# Patient Record
Sex: FEMALE | Race: BLACK OR AFRICAN AMERICAN | NOT HISPANIC OR LATINO
[De-identification: names, ages, dates, MRNs, and addresses within clinical notes are randomized per-mention and may not be internally consistent; named-entity substitution may affect disease eponyms.]

---

## 2019-03-26 PROBLEM — Z00.00 ENCOUNTER FOR PREVENTIVE HEALTH EXAMINATION: Status: ACTIVE | Noted: 2019-03-26

## 2019-03-29 ENCOUNTER — APPOINTMENT (OUTPATIENT)
Dept: NEUROSURGERY | Facility: CLINIC | Age: 69
End: 2019-03-29
Payer: MEDICARE

## 2019-03-29 VITALS
HEIGHT: 63 IN | RESPIRATION RATE: 18 BRPM | WEIGHT: 133 LBS | DIASTOLIC BLOOD PRESSURE: 75 MMHG | OXYGEN SATURATION: 99 % | SYSTOLIC BLOOD PRESSURE: 149 MMHG | HEART RATE: 61 BPM | BODY MASS INDEX: 23.57 KG/M2

## 2019-03-29 DIAGNOSIS — Z87.39 PERSONAL HISTORY OF OTHER DISEASES OF THE MUSCULOSKELETAL SYSTEM AND CONNECTIVE TISSUE: ICD-10-CM

## 2019-03-29 DIAGNOSIS — Z86.59 PERSONAL HISTORY OF OTHER MENTAL AND BEHAVIORAL DISORDERS: ICD-10-CM

## 2019-03-29 DIAGNOSIS — Z87.898 PERSONAL HISTORY OF OTHER SPECIFIED CONDITIONS: ICD-10-CM

## 2019-03-29 PROCEDURE — 99205 OFFICE O/P NEW HI 60 MIN: CPT

## 2019-03-29 RX ORDER — PANTOPRAZOLE 40 MG/1
40 TABLET, DELAYED RELEASE ORAL
Refills: 0 | Status: ACTIVE | COMMUNITY

## 2019-03-29 NOTE — PLAN
[FreeTextEntry1] : The disease process of cavernous malformation and cerebral aneurysm were explained to the patient. \par Surgical treatment of cavernous malformation was offered to the patient by Dr. Bean but refuses. Will follow cav mal and aneurysm conservatively with MRI/A\par \par Plan:\par 1. Refer to epilepsy team to manage seizures and seizure medication\par 2. follow up MRA/I brain May 2019 to follow up cavernous malformation and aneurysm

## 2019-03-29 NOTE — REASON FOR VISIT
[New Patient Visit] : a new patient visit [Referred By: _________] : Patient was referred by MIN [FreeTextEntry1] : to evaluate cerebral aneurysm

## 2019-03-29 NOTE — PHYSICAL EXAM
[General Appearance - Alert] : alert [General Appearance - In No Acute Distress] : in no acute distress [Oriented To Time, Place, And Person] : oriented to person, place, and time [Impaired Insight] : insight and judgment were intact [Person] : oriented to person [Place] : oriented to place [Time] : oriented to time [Cranial Nerves Optic (II)] : visual acuity intact bilaterally,  pupils equal round and reactive to light [Cranial Nerves Oculomotor (III)] : extraocular motion intact [Cranial Nerves Trigeminal (V)] : facial sensation intact symmetrically [Cranial Nerves Facial (VII)] : face symmetrical [Cranial Nerves Vestibulocochlear (VIII)] : hearing was intact bilaterally [Cranial Nerves Glossopharyngeal (IX)] : tongue and palate midline [Cranial Nerves Accessory (XI - Cranial And Spinal)] : head turning and shoulder shrug symmetric [Cranial Nerves Hypoglossal (XII)] : there was no tongue deviation with protrusion [Motor Tone] : muscle tone was normal in all four extremities [Motor Strength] : muscle strength was normal in all four extremities [PERRL With Normal Accommodation] : pupils were equal in size, round, reactive to light, with normal accommodation [Full Visual Field] : full visual field [Neck Appearance] : the appearance of the neck was normal [] : no respiratory distress [Respiration, Rhythm And Depth] : normal respiratory rhythm and effort [Exaggerated Use Of Accessory Muscles For Inspiration] : no accessory muscle use [Auscultation Breath Sounds / Voice Sounds] : lungs were clear to auscultation bilaterally [Apical Impulse] : the apical impulse was normal [Heart Rate And Rhythm] : heart rate was normal and rhythm regular [Heart Sounds] : normal S1 and S2 [Abnormal Walk] : normal gait

## 2019-03-29 NOTE — REVIEW OF SYSTEMS
[Feeling Poorly] : feeling poorly [Feeling Tired] : feeling tired [As Noted in HPI] : as noted in HPI [Arm Weakness] : arm weakness [Hand Weakness] :  hand weakness [Numbness] : numbness [Tingling] : tingling [Abnormal Sensation] : an abnormal sensation [Hypersensitivity] : hypersensitivity [Seizures] : convulsions [Vertigo] : vertigo [Anxiety] : anxiety [Palpitations] : palpitations [Heartburn] : heartburn [Joint Stiffness] : joint stiffness [Negative] : Respiratory

## 2019-03-29 NOTE — HISTORY OF PRESENT ILLNESS
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 \par \par The cavernous malformations were being followed by Dr. Jones in [place] and did not recommend treatment, per patient. She denies headaches but c/o weakness and seizures which were controlled by Lamictal 100mg daily until the change in medication to Lamotrigine. Seizure activities have increased to once daily since starting on Lamotrigine 2 weeks ago.\par \par She has no hx of HTN and denies family hx of cerebral aneurysm.\par \par Handedness: RIGHT\par \par Patient Address:\par [address]\[place] [address]\par \par Referring MD\par Dr. Luis Shoemaker\par [address]\[place] [address]\par \par \par PCP:

## 2019-03-29 NOTE — DATA REVIEWED
[de-identified] : MRI brain from 5/2019: no significant change in the right cerebellar cavernoma with assoc. venous angioma/deveopmental venous anomaly. No significant change in the posterior right temporal/inferior right parietal cavernoma. No evidence of recent infarction. No evidence of recent hemorrhage [de-identified] : MRA:  2.8 mm supraclinoid L ICA aneurysm

## 2019-06-24 NOTE — HISTORY OF PRESENT ILLNESS
[FreeTextEntry1] : RIGHT-HANDED Non-smoker with history of right posterior temporal/parietal cavernoma and right cerebellar cavernoma/venous anomaly (stable exam from 2016, 2017, and 2018 per radiology reports), seizures (since 2005) who initially presented to the office to evaluate a 2.8 mm supraclinoid L ICA aneurysm found on a 2016 MRA for headache workup. \par \par The cavernous malformations were being followed by Dr. Jones in [place] and did not recommend treatment, per patient. She denied headaches but c/o weakness and seizures which were controlled by Lamictal 100mg daily until the change in medication to Lamotrigine. Seizure activities have increased to once daily since starting on Lamotrigine 2 weeks ago. She was referred to epilepsy team to manage seizures and AEDs.\par \par She has no hx of HTN and denies family hx of cerebral aneurysm.\par \par MRI brain from 5/2019: no significant change in the right cerebellar cavernoma with assoc. venous angioma/developmental venous anomaly. No significant change in the posterior right temporal/inferior right parietal cavernoma. No evidence of recent infarction. No evidence of recent hemorrhage \par \par MRA: MRA: 2.8 mm supraclinoid L ICA aneurysm \par \par Handedness: RIGHT\par \par Patient Address:\par [address]\[place] [address]\par \par Referring MD and PCP\par Dr. Luis Shoemaker\par [address]\[place] [address]\par \par \par \par  \par

## 2019-06-24 NOTE — REASON FOR VISIT
[Follow-Up: _____] : a [unfilled] follow-up visit [FreeTextEntry1] : to review MRI/A for cavernous malformation and aneurysm

## 2019-06-25 ENCOUNTER — APPOINTMENT (OUTPATIENT)
Dept: NEUROSURGERY | Facility: CLINIC | Age: 69
End: 2019-06-25

## 2019-10-04 ENCOUNTER — NON-APPOINTMENT (OUTPATIENT)
Age: 69
End: 2019-10-04

## 2019-10-04 ENCOUNTER — APPOINTMENT (OUTPATIENT)
Dept: HEART AND VASCULAR | Facility: CLINIC | Age: 69
End: 2019-10-04
Payer: MEDICARE

## 2019-10-04 VITALS
DIASTOLIC BLOOD PRESSURE: 84 MMHG | RESPIRATION RATE: 20 BRPM | SYSTOLIC BLOOD PRESSURE: 120 MMHG | HEIGHT: 63 IN | BODY MASS INDEX: 23.21 KG/M2 | WEIGHT: 131 LBS | HEART RATE: 72 BPM

## 2019-10-04 DIAGNOSIS — M47.899 OTHER SPONDYLOSIS, SITE UNSPECIFIED: ICD-10-CM

## 2019-10-04 DIAGNOSIS — M16.11 UNILATERAL PRIMARY OSTEOARTHRITIS, RIGHT HIP: ICD-10-CM

## 2019-10-04 PROCEDURE — 99204 OFFICE O/P NEW MOD 45 MIN: CPT

## 2019-10-04 PROCEDURE — 93000 ELECTROCARDIOGRAM COMPLETE: CPT

## 2019-10-04 RX ORDER — ASCORBIC ACID 500 MG
TABLET ORAL
Refills: 0 | Status: ACTIVE | COMMUNITY

## 2019-10-04 NOTE — PHYSICAL EXAM
[General Appearance - Well Developed] : well developed [Normal Appearance] : normal appearance [General Appearance - Well Nourished] : well nourished [No Deformities] : no deformities [Normal Conjunctiva] : the conjunctiva exhibited no abnormalities [Normal Oral Mucosa] : normal oral mucosa [Normal Oropharynx] : normal oropharynx [Heart Rate And Rhythm] : heart rate and rhythm were normal [Heart Sounds] : normal S1 and S2 [Murmurs] : no murmurs present [Arterial Pulses Normal] : the arterial pulses were normal [Exaggerated Use Of Accessory Muscles For Inspiration] : no accessory muscle use [Respiration, Rhythm And Depth] : normal respiratory rhythm and effort [Auscultation Breath Sounds / Voice Sounds] : lungs were clear to auscultation bilaterally [Abdomen Soft] : soft [Bowel Sounds] : normal bowel sounds [Abdomen Tenderness] : non-tender [Abnormal Walk] : normal gait [Nail Clubbing] : no clubbing of the fingernails [Cyanosis, Localized] : no localized cyanosis [Petechial Hemorrhages (___cm)] : no petechial hemorrhages [Nail Splinter Hemorrhages] : no splinter hemorrhages of the nails [Skin Color & Pigmentation] : normal skin color and pigmentation [Skin Turgor] : normal skin turgor [] : no rash [No Venous Stasis] : no venous stasis [Oriented To Time, Place, And Person] : oriented to person, place, and time [Impaired Insight] : insight and judgment were intact [Affect] : the affect was normal [Mood] : the mood was normal [No Anxiety] : not feeling anxious [FreeTextEntry1] : No JVD

## 2019-10-04 NOTE — HISTORY OF PRESENT ILLNESS
[FreeTextEntry1] : 67 y/o female with history of seizure d/o, OA, cavernous malformations, and PACs noted on EKG present for evaluation of PACs. She states that she was noted to have PACs, but was unaware of them. She was referred because they noted on the EKG. She states she has had episodes in the past were shes felt some palpitations, short lived. They do not bother her. She had a Holter monitor in the last two years per her, and states it was normal. She occasionally feels her heart beating when shes exercising. She denies CP, SOB, ATKINSON, pre-syncope, or syncope. She has had an echo and stress test per her done in the past, which were also normal.

## 2019-10-09 NOTE — REASON FOR VISIT
[Follow-Up: _____] : a [unfilled] follow-up visit [FreeTextEntry1] : LAST VISIT 3/29/19:\par \par She denied headaches but complained of increasing seizure activities (once daily) since she was started on Lamotrigine.\par \par MRI brain from 5/2018 showed no significant change in the right cerebellar cavernoma with assoc. venous angioma/developmental venous anomaly. No significant change in the posterior right temporal/inferior right parietal cavernoma. No evidence of recent infarction. No evidence of recent hemorrhage \par \par  MRA brain: 2.8 mm unchanged dural ring L ICA aneurysm\par .\par The surgical treatment of cavernous malformation was offered to the patient but she wished to follow it conservatively with an MRI/A. She was advised to obtain an MRI/A brain in May 2019. She was referred to epilepsy team in managing her seizures.\par  \par TODAY'S VISIT:

## 2019-10-09 NOTE — END OF VISIT
[FreeTextEntry3] : Written by Rosie Coronel NP acting as a scribe for Dr. Js Bean MD.  The documentation recorded by the scribe accurately reflects the service I personally performed and the decisions made by me, Dr. Js Bean \par \par

## 2019-10-09 NOTE — HISTORY OF PRESENT ILLNESS
[FreeTextEntry1] : RIGHT-HANDED Non-smoker with history of right posterior temporal/parietal cavernoma and right cerebellar cavernoma/venous anomaly (stable exam from 2016, 2017, and 2018 per radiology reports), seizures (since 2005) who presented to the office to evaluate a 2.8 mm supraclinoid L ICA aneurysm found on a 2016 MRA for headache workup. \par \par The cavernous malformations were being followed by Dr. Jones in [place] and did not recommend treatment, per patient. She had no headaches but c/o weakness and seizures which were controlled by Lamictal 100mg daily until the change in medication to Lamotrigine. Seizure activities increased to once daily since starting on Lamotrigine.\par \par She has no hx of HTN and denies family hx of cerebral aneurysm.\par \par Handedness: RIGHT\par \par Patient Address:\par [address]\[place] [address]\par \par Referring MD/ PCP:\par Dr. Luis Shoemaker\par [address]\[place] [address]\par \par \par

## 2019-10-10 ENCOUNTER — APPOINTMENT (OUTPATIENT)
Dept: NEUROSURGERY | Facility: CLINIC | Age: 69
End: 2019-10-10

## 2020-07-21 ENCOUNTER — APPOINTMENT (OUTPATIENT)
Dept: CARDIOLOGY | Facility: CLINIC | Age: 70
End: 2020-07-21
Payer: MEDICARE

## 2020-07-21 VITALS
OXYGEN SATURATION: 98 % | HEART RATE: 64 BPM | RESPIRATION RATE: 20 BRPM | DIASTOLIC BLOOD PRESSURE: 60 MMHG | HEIGHT: 63 IN | SYSTOLIC BLOOD PRESSURE: 100 MMHG

## 2020-07-21 DIAGNOSIS — R42 DIZZINESS AND GIDDINESS: ICD-10-CM

## 2020-07-21 PROCEDURE — 0296T: CPT

## 2020-07-21 PROCEDURE — 99204 OFFICE O/P NEW MOD 45 MIN: CPT

## 2020-07-21 NOTE — ASSESSMENT
[FreeTextEntry1] : EKG: July 2020 - NSR, LAE, NSTT\par Apr 2019 - sr with pvs and pacs in bigeminal pattern. septal infarct. \par \par zio patch - placed\par \par carotid ultrasound\par 2d echo\par Nuclear stress test. pt would like to start with exercise test first. \par

## 2020-07-21 NOTE — HISTORY OF PRESENT ILLNESS
[FreeTextEntry1] : 68 y/o F with petit mal seizure and has some previous cardiac arrhythmias who presents for general checkup. \par She is concerned if the pauses are decreasing oxygen to her brain. \par July 16 2020 - NSR, LAE, NSTT - \par \par She has had PVCs in the past.\par \par Last seizure activity was last week. \par she has cavernous  angiomas and needs follow ups with neurosurgery. \par She complains of dizziness and palpitations. \par \par Pt has intermittent sob on exertion. she denies chest pain , orthopnea, pnd, edema. \par \par

## 2020-07-21 NOTE — PHYSICAL EXAM
[General Appearance - Well Developed] : well developed [Normal Appearance] : normal appearance [Well Groomed] : well groomed [No Deformities] : no deformities [General Appearance - Well Nourished] : well nourished [General Appearance - In No Acute Distress] : no acute distress [Normal Conjunctiva] : the conjunctiva exhibited no abnormalities [Eyelids - No Xanthelasma] : the eyelids demonstrated no xanthelasmas [Normal Oral Mucosa] : normal oral mucosa [No Oral Pallor] : no oral pallor [No Oral Cyanosis] : no oral cyanosis [Normal Jugular Venous A Waves Present] : normal jugular venous A waves present [Normal Jugular Venous V Waves Present] : normal jugular venous V waves present [Heart Rate And Rhythm] : heart rate and rhythm were normal [No Jugular Venous Alexis A Waves] : no jugular venous alexis A waves [Heart Sounds] : normal S1 and S2 [Murmurs] : no murmurs present [Exaggerated Use Of Accessory Muscles For Inspiration] : no accessory muscle use [Respiration, Rhythm And Depth] : normal respiratory rhythm and effort [Auscultation Breath Sounds / Voice Sounds] : lungs were clear to auscultation bilaterally [Abdomen Soft] : soft [Abdomen Tenderness] : non-tender [Abdomen Mass (___ Cm)] : no abdominal mass palpated [Abnormal Walk] : normal gait [Gait - Sufficient For Exercise Testing] : the gait was sufficient for exercise testing [Nail Clubbing] : no clubbing of the fingernails [Cyanosis, Localized] : no localized cyanosis [Petechial Hemorrhages (___cm)] : no petechial hemorrhages [Skin Color & Pigmentation] : normal skin color and pigmentation [] : no rash [Skin Lesions] : no skin lesions [No Venous Stasis] : no venous stasis [No Skin Ulcers] : no skin ulcer [No Xanthoma] : no  xanthoma was observed [Oriented To Time, Place, And Person] : oriented to person, place, and time [Affect] : the affect was normal [Mood] : the mood was normal [No Anxiety] : not feeling anxious

## 2020-08-19 ENCOUNTER — RESULT REVIEW (OUTPATIENT)
Age: 70
End: 2020-08-19

## 2020-08-26 ENCOUNTER — APPOINTMENT (OUTPATIENT)
Dept: NEUROLOGY | Facility: CLINIC | Age: 70
End: 2020-08-26
Payer: MEDICARE

## 2020-08-26 VITALS
SYSTOLIC BLOOD PRESSURE: 123 MMHG | HEART RATE: 66 BPM | DIASTOLIC BLOOD PRESSURE: 81 MMHG | WEIGHT: 125 LBS | BODY MASS INDEX: 22.15 KG/M2 | HEIGHT: 63 IN

## 2020-08-26 DIAGNOSIS — H93.8X3 OTHER SPECIFIED DISORDERS OF EAR, BILATERAL: ICD-10-CM

## 2020-08-26 DIAGNOSIS — G40.109 LOCALIZATION-RELATED (FOCAL) (PARTIAL) SYMPTOMATIC EPILEPSY AND EPILEPTIC SYNDROMES WITH SIMPLE PARTIAL SEIZURES, NOT INTRACTABLE, W/OUT STATUS EPILEPTICUS: ICD-10-CM

## 2020-08-26 PROCEDURE — 99205 OFFICE O/P NEW HI 60 MIN: CPT

## 2020-08-26 NOTE — ASSESSMENT
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 The seizures are not well controlled on lamictal  mg daily.  She has them every few weeks to few months. She says that she is compliant. She had lethargy on lamictal  mg daily.  Increase lamictal ER to 125 mg daily. \par Routine and ambulatory EEG\par \par Referral to neuroendovascular specialist for regular follow up for the cavernous malformation and cerebral aneurysm.  \par \par Feeling of fullness in her ears - ENT consult.\par \par F/U three months or a few weeks after amb EEG.

## 2020-08-26 NOTE — CONSULT LETTER
[Dear  ___] : Dear  [unfilled], [FreeTextEntry1] : I had the pleasure of evaluating your patient, LAURA HSU, Please see the assessment section below for a summary of my diagnostic impression and plan.\par \par Thank you very much for allowing me to participate in the care of this patient. If you have any questions, please do not hesitate to contact me.\par \par Sincerely,\par \par Josefina Silva MD\par \par \par

## 2020-08-26 NOTE — HISTORY OF PRESENT ILLNESS
[FreeTextEntry1] : Soledad Springer is a 69 year old woman with a history of right posterior temporal cavernoma and right cerebellar cavernous/venous anomaly and supraclinoid ICA aneurysm and has yearly MRI/MRA with neurosurgery. She presents today for a consultation for seizures. \par \par She was diagnosed with seizures in 2006. She describes a transient numbness to the left cheek and tingling to the face, arm, and leg on the left side.  These episodes have not changed for the past 10 years.  She previously had an aura of "sour stomach" feeling but no longer has this aura with her seizures.  Ms. Springer was previously on Gabepentin.  In 2010, she had an admission to [facility] with no seizure episodes and reported normal EEG. She was started on Lamictal 100mg ER at that time. She was unable to tolerate Lamictal 200mg due to side effects of fatigue.  Ms. Springer reports a seizure episode this morning. \par \par She has rare episodes of lightheadedness likes she is going to lose consciousness. \par \par She also reports a fullness feeling in her ears. Ms. Springer has not seen ENT. \par \par The remaining neurological review of systems is negative. \par

## 2020-08-26 NOTE — PHYSICAL EXAM
[FreeTextEntry1] : Physical examination \par General: No acute distress, Awake, Alert.   \par Slight postsurgical pupillary chages from cataract surgery. \par \par Mental status \par Awake, alert, and oriented to person, time and place, Normal attention span and concentration, Recent and remote memory intact, Language intact, Fund of knowledge intact.   \par Delayed recall 2/3.\par \par Cranial Nerves \par II: VFF  \par III, IV, VI: Slightly irregular,  EOMI.    \par V: Facial sensation is normal B/L.   \par VII: Facial strength mild left facial weakness - UMN type. \par \par \par VIII: Gross hearing is intact.   \par \par \par IX, X: Palate is midline and elevates symmetrically.   \par XI: Trapezius normal strength.   \par XII: Tongue midline without atrophy or fasciculations. \par \par Motor exam  \par Muscle tone - no evidence of rigidity or resistance in all 4 extremities.  \par No atrophy or fasciculations \par Muscle Strength: arms and legs, proximal and distal flexors and extensors are normal \par \par No UE drift.\par \par Reflexes \par Diffuse hyperreflexia.  \par \par Plantars right: mute.   \par Plantars left: mute.   \par \par \par Coordination \par Finger to nose: Normal.  \par Heel to shin: Normal.   \par \par \par Sensory \par Intact sensation to vibration and cold.\par \par Gait \par Normal including heels, toes, and tandem gait.  \par

## 2020-09-08 ENCOUNTER — APPOINTMENT (OUTPATIENT)
Dept: CARDIOLOGY | Facility: CLINIC | Age: 70
End: 2020-09-08
Payer: MEDICARE

## 2020-09-08 VITALS
BODY MASS INDEX: 22.15 KG/M2 | SYSTOLIC BLOOD PRESSURE: 122 MMHG | DIASTOLIC BLOOD PRESSURE: 70 MMHG | WEIGHT: 125 LBS | HEART RATE: 64 BPM | HEIGHT: 63 IN

## 2020-09-08 PROCEDURE — 99214 OFFICE O/P EST MOD 30 MIN: CPT

## 2020-09-08 RX ORDER — CHROMIUM 200 MCG
TABLET ORAL
Refills: 0 | Status: DISCONTINUED | COMMUNITY
End: 2020-09-08

## 2020-09-08 NOTE — ASSESSMENT
[FreeTextEntry1] : EKG: July 2020 - NSR, LAE, NSTT\par Apr 2019 - sr with pvs and pacs in bigeminal pattern. septal infarct. \par \par zio patch - pt returned today and will call in 1-2 weeks for results. \par \par carotid ultrasound - normal. \par 2d echo - normal ef - left atrial size moderately enlarged\par Nuclear stress test. change to pharmacologic as pt is unable to tolerate treadmill. \par

## 2020-09-08 NOTE — HISTORY OF PRESENT ILLNESS
[FreeTextEntry1] : 70 y/o F with petit mal seizure and has some previous cardiac arrhythmias who presents for general checkup. \par She is concerned if the pauses are decreasing oxygen to her brain. \par July 16 2020 - NSR, LAE, NSTT - \par \par She has had PVCs in the past.\par \par Last seizure activity was last week. \par she has cavernous  angiomas and needs follow ups with neurosurgery. \par She complains of dizziness and palpitations. \par \par since last visit\par Pt has intermittent sob on exertion. she denies chest pain , orthopnea, pnd, edema. \par carotid normal\par did not have stress test as pt injured her right hip. \par

## 2020-09-08 NOTE — PHYSICAL EXAM
[General Appearance - Well Developed] : well developed [Normal Appearance] : normal appearance [Well Groomed] : well groomed [General Appearance - Well Nourished] : well nourished [No Deformities] : no deformities [General Appearance - In No Acute Distress] : no acute distress [Normal Conjunctiva] : the conjunctiva exhibited no abnormalities [Eyelids - No Xanthelasma] : the eyelids demonstrated no xanthelasmas [Normal Oral Mucosa] : normal oral mucosa [No Oral Pallor] : no oral pallor [No Oral Cyanosis] : no oral cyanosis [Normal Jugular Venous A Waves Present] : normal jugular venous A waves present [Normal Jugular Venous V Waves Present] : normal jugular venous V waves present [No Jugular Venous Alexis A Waves] : no jugular venous alexis A waves [Respiration, Rhythm And Depth] : normal respiratory rhythm and effort [Exaggerated Use Of Accessory Muscles For Inspiration] : no accessory muscle use [Auscultation Breath Sounds / Voice Sounds] : lungs were clear to auscultation bilaterally [Heart Rate And Rhythm] : heart rate and rhythm were normal [Heart Sounds] : normal S1 and S2 [Murmurs] : no murmurs present [Abdomen Soft] : soft [Abdomen Tenderness] : non-tender [Abdomen Mass (___ Cm)] : no abdominal mass palpated [Abnormal Walk] : normal gait [Gait - Sufficient For Exercise Testing] : the gait was sufficient for exercise testing [Nail Clubbing] : no clubbing of the fingernails [Cyanosis, Localized] : no localized cyanosis [Petechial Hemorrhages (___cm)] : no petechial hemorrhages [Skin Color & Pigmentation] : normal skin color and pigmentation [] : no rash [No Venous Stasis] : no venous stasis [Skin Lesions] : no skin lesions [No Skin Ulcers] : no skin ulcer [No Xanthoma] : no  xanthoma was observed [Oriented To Time, Place, And Person] : oriented to person, place, and time [Affect] : the affect was normal [Mood] : the mood was normal [No Anxiety] : not feeling anxious

## 2020-09-24 ENCOUNTER — APPOINTMENT (OUTPATIENT)
Dept: NEUROLOGY | Facility: CLINIC | Age: 70
End: 2020-09-24

## 2021-03-09 ENCOUNTER — APPOINTMENT (OUTPATIENT)
Dept: CARDIOLOGY | Facility: CLINIC | Age: 71
End: 2021-03-09

## 2022-01-14 ENCOUNTER — APPOINTMENT (OUTPATIENT)
Dept: CARDIOLOGY | Facility: CLINIC | Age: 72
End: 2022-01-14

## 2022-01-27 ENCOUNTER — NON-APPOINTMENT (OUTPATIENT)
Age: 72
End: 2022-01-27

## 2022-01-27 ENCOUNTER — APPOINTMENT (OUTPATIENT)
Dept: CARDIOLOGY | Facility: CLINIC | Age: 72
End: 2022-01-27
Payer: MEDICARE

## 2022-01-27 VITALS
WEIGHT: 125 LBS | OXYGEN SATURATION: 98 % | DIASTOLIC BLOOD PRESSURE: 84 MMHG | HEIGHT: 63 IN | HEART RATE: 60 BPM | BODY MASS INDEX: 22.15 KG/M2 | SYSTOLIC BLOOD PRESSURE: 126 MMHG

## 2022-01-27 DIAGNOSIS — R06.02 SHORTNESS OF BREATH: ICD-10-CM

## 2022-01-27 PROCEDURE — 93000 ELECTROCARDIOGRAM COMPLETE: CPT

## 2022-01-27 PROCEDURE — 99214 OFFICE O/P EST MOD 30 MIN: CPT

## 2022-01-27 RX ORDER — LAMOTRIGINE 25 MG/1
25 TABLET ORAL
Qty: 150 | Refills: 5 | Status: DISCONTINUED | COMMUNITY
Start: 2020-09-02 | End: 2022-01-27

## 2022-01-27 NOTE — REASON FOR VISIT
[Arrhythmia/ECG Abnorrmalities] : arrhythmia/ECG abnormalities [Follow-Up - Clinic] : a clinic follow-up of

## 2022-01-27 NOTE — ASSESSMENT
[FreeTextEntry1] : EKG: July 2020 - NSR, LAE, NSTT\par Apr 2019 - sr with pvs and pacs in bigeminal pattern. septal infarct. \par \par zio patch - pt returned today and will call in 1-2 weeks for results. \par \par carotid ultrasound - normal. \par 2d echo - normal ef - left atrial size moderately enlarged\par Nuclear stress test. change to pharmacologic as pt is unable to tolerate treadmill. \par \par \par Jan 2022\par ekg: \par junctional rhythm with PVC - pt denies palpitations , or syncope. \par pt with fatty liver and uncontrolled lipid panel\par has dizziness - seeing ENT. \par has mild sob on exertion previous pharm stress never done\par order 2d echo\par pharm stress\par carotid u/s

## 2022-02-01 PROBLEM — D72.819 LEUKOPENIA: Status: ACTIVE | Noted: 2022-02-01

## 2022-02-01 PROBLEM — D64.9 ANEMIA: Status: ACTIVE | Noted: 2022-02-01

## 2022-02-01 PROBLEM — E67.3 HIGH VITAMIN D LEVEL: Status: ACTIVE | Noted: 2022-02-01

## 2022-02-01 PROBLEM — R74.8 ELEVATED VITAMIN B12 LEVEL: Status: ACTIVE | Noted: 2022-02-01

## 2022-02-01 PROBLEM — D72.810 LYMPHOPENIA: Status: ACTIVE | Noted: 2022-02-01

## 2022-02-04 ENCOUNTER — RESULT REVIEW (OUTPATIENT)
Age: 72
End: 2022-02-04

## 2022-02-04 ENCOUNTER — APPOINTMENT (OUTPATIENT)
Dept: HEMATOLOGY ONCOLOGY | Facility: CLINIC | Age: 72
End: 2022-02-04
Payer: MEDICARE

## 2022-02-04 VITALS
HEART RATE: 59 BPM | HEIGHT: 63 IN | TEMPERATURE: 96.4 F | WEIGHT: 125.06 LBS | OXYGEN SATURATION: 100 % | DIASTOLIC BLOOD PRESSURE: 73 MMHG | RESPIRATION RATE: 18 BRPM | BODY MASS INDEX: 22.16 KG/M2 | SYSTOLIC BLOOD PRESSURE: 156 MMHG

## 2022-02-04 DIAGNOSIS — D64.9 ANEMIA, UNSPECIFIED: ICD-10-CM

## 2022-02-04 DIAGNOSIS — R77.2 ABNORMALITY OF ALPHAFETOPROTEIN: ICD-10-CM

## 2022-02-04 DIAGNOSIS — D72.819 DECREASED WHITE BLOOD CELL COUNT, UNSPECIFIED: ICD-10-CM

## 2022-02-04 DIAGNOSIS — D72.810 LYMPHOCYTOPENIA: ICD-10-CM

## 2022-02-04 DIAGNOSIS — E67.3 HYPERVITAMINOSIS D: ICD-10-CM

## 2022-02-04 DIAGNOSIS — R74.8 ABNORMAL LEVELS OF OTHER SERUM ENZYMES: ICD-10-CM

## 2022-02-04 PROCEDURE — 99205 OFFICE O/P NEW HI 60 MIN: CPT

## 2022-02-04 RX ORDER — NICOTINE POLACRILEX 2 MG
67 LOZENGE BUCCAL
Refills: 0 | Status: ACTIVE | COMMUNITY
Start: 2022-02-04

## 2022-02-04 RX ORDER — VIT C/ZN GLUC/HERBAL NO.325 90 MG-15MG
LOZENGE MUCOUS MEMBRANE
Refills: 0 | Status: ACTIVE | COMMUNITY
Start: 2022-02-04

## 2022-02-04 NOTE — ASSESSMENT
[FreeTextEntry1] : #anemia\par We have discussed the differential diagnosis of anemia.\par Will also rule out causes of anemia including nutritional deficiencies, thyroid dysfunction, hemolysis and hematologic disorders. Will check CBC with diff, CMP, LDH, Haptoglobin, Jaquan, B12, Folate, TSH, retic ct, Epo, PS, ESR/CRP, immunoglobulins, KWAN, iron and ferritin\par \par #leukopenia\par We have reviewed the differential diagnosis of pancytopenia including nutritional deficiencies, autoimmune dz, viruses, medications.\par We have reviewed the work up thus far. Will do complete work up including CBC with diff, CMP, retic, LDH, Haptoglobin, Jaquan, ESR/CRP, B12/FOLATE, iron panel and ferritin, KWAN, immunoglobulins,FERNANDO. \par \par #elevated AFP and liver cysts\par recheck AFP\par MRI liver\par reviewed US reports - fatty liver and liver cysts\par \par #elevated vit D. - told to stop taking vit D\par \par #elevated b12 - told to stop taking b12\par \par RTC in 10 days to discuss bloodwork

## 2022-02-04 NOTE — HISTORY OF PRESENT ILLNESS
[de-identified] : Ms. Springer is a 71 year old woman who presents for initial consultation of abnormal blood work.\par Anemia, leukopenia, and lymphopenia date back to \par Blood work from 2022 revealed elevated B12 >2000, elevated vitamin D @ 82, and elevated vitamin D1, 25 @ 99\par She reports previously taking PO iron intermittently since 2019; never received IV iron or blood transfusion\par \par Of note, she has significant medical history of cerebral aneurysms, seizure disorder, cavernous malformation-followed closely by neurosurgery\par \par She denies frequent illness and fevers, reports hot flashes "come and go" with associated palpitations\par She reports stable dizziness and lightheadedness since seizure disorder diagnosis\par \par Age of Menarche: 11\par Age of Menopause: hysterectomy  for fibroids\par OCP/HRT: OCP <1 year briefly due to cystic breasts\par ,   x1

## 2022-02-04 NOTE — PHYSICAL EXAM
home when stable ambulating [Fully active, able to carry on all pre-disease performance without restriction] : Status 0 - Fully active, able to carry on all pre-disease performance without restriction [Normal] : affect appropriate

## 2022-02-09 ENCOUNTER — RESULT REVIEW (OUTPATIENT)
Age: 72
End: 2022-02-09

## 2022-02-11 NOTE — HISTORY OF PRESENT ILLNESS
[de-identified] : Ms. Springer is a 71 year old woman who presents for initial consultation of abnormal blood work.\par Anemia, leukopenia, and lymphopenia date back to \par Blood work from 2022 revealed elevated B12 >2000, elevated vitamin D @ 82, and elevated vitamin D1, 25 @ 99\par She reports previously taking PO iron intermittently since 2019; never received IV iron or blood transfusion\par \par Of note, she has significant medical history of cerebral aneurysms, seizure disorder, cavernous malformation-followed closely by neurosurgery\par \par She denies frequent illness and fevers, reports hot flashes "come and go" with associated palpitations\par She reports stable dizziness and lightheadedness since seizure disorder diagnosis\par \par Age of Menarche: 11\par Age of Menopause: hysterectomy  for fibroids\par OCP/HRT: OCP <1 year briefly due to cystic breasts\par ,   x1

## 2022-02-17 ENCOUNTER — RESULT REVIEW (OUTPATIENT)
Age: 72
End: 2022-02-17

## 2022-02-18 ENCOUNTER — APPOINTMENT (OUTPATIENT)
Dept: HEMATOLOGY ONCOLOGY | Facility: CLINIC | Age: 72
End: 2022-02-18

## 2022-03-09 ENCOUNTER — NON-APPOINTMENT (OUTPATIENT)
Age: 72
End: 2022-03-09

## 2022-03-09 ENCOUNTER — APPOINTMENT (OUTPATIENT)
Dept: NEUROLOGY | Facility: CLINIC | Age: 72
End: 2022-03-09
Payer: MEDICARE

## 2022-03-09 VITALS
HEART RATE: 68 BPM | HEIGHT: 63 IN | BODY MASS INDEX: 21.79 KG/M2 | SYSTOLIC BLOOD PRESSURE: 105 MMHG | TEMPERATURE: 98.2 F | OXYGEN SATURATION: 99 % | DIASTOLIC BLOOD PRESSURE: 68 MMHG | WEIGHT: 123 LBS

## 2022-03-09 DIAGNOSIS — Z78.9 OTHER SPECIFIED HEALTH STATUS: ICD-10-CM

## 2022-03-09 DIAGNOSIS — Z82.49 FAMILY HISTORY OF ISCHEMIC HEART DISEASE AND OTHER DISEASES OF THE CIRCULATORY SYSTEM: ICD-10-CM

## 2022-03-09 DIAGNOSIS — Z82.61 FAMILY HISTORY OF ARTHRITIS: ICD-10-CM

## 2022-03-09 PROCEDURE — 99215 OFFICE O/P EST HI 40 MIN: CPT

## 2022-03-09 NOTE — PHYSICAL EXAM
[FreeTextEntry1] : Physical examination \par General: No acute distress, Awake, Alert.   \par Slight postsurgical pupillary changes from cataract surgery. \par \par Mental status \par Awake, alert, and oriented to person, time and place, Normal attention span and concentration, Recent and remote memory intact, Language intact, Fund of knowledge intact.   \par Delayed recall 2/3 with hints 3/3\par \par Cranial Nerves \par II: VFF  \par III, IV, VI: Slightly irregular,  EOMI.    \par V: Facial sensation is normal B/L.   \par VII: Facial strength mild left facial weakness - UMN type. \par \par \par VIII: Gross hearing is intact.   \par \par \par IX, X: Palate is midline and elevates symmetrically.   \par XI: Trapezius normal strength.   \par XII: Tongue midline without atrophy or fasciculations. \par \par Motor exam  \par Muscle tone - no evidence of rigidity or resistance in all 4 extremities.  \par No atrophy or fasciculations \par Muscle Strength: arms and legs, proximal and distal flexors and extensors are normal \par \par No UE drift.\par \par Reflexes \par Diffuse hyperreflexia.  \par \par Plantars right: mute.   \par Plantars left: mute.   \par \par \par Coordination \par Finger to nose: Normal.  \par Heel to shin: Normal.   \par \par \par Sensory \par Intact sensation to vibration and cold.\par \par Gait \par Normal including heels, toes, and tandem gait.  \par

## 2022-03-09 NOTE — ASSESSMENT
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 If consider Keppra after EEG. \par \par Tenderness to scalp-check ESR, CRP.\par \par Follow up in 6 weeks.

## 2022-03-09 NOTE — DATA REVIEWED
CRX [de-identified] : 7/18/20 MRI brain \par Again demonstrated is a right posterior temporal lobe/inferior parietal lobe \par lesion with the typical imaging appearance of a cavernous angioma. There is high signal intensity in\par the central portion of the lesion. There is a thick rim of low signal intensity on T2-weighted \par imaging and "blooming" on susceptibility weighted imaging. Lesion measures 18 mm in AP diameter on \par image 17 series 11. Lesion is not significantly changed since 2016. There is no evidence of recent \par hemorrhage associated with the lesion.  \par    \par  Image 8 series 11 demonstrates a 3 mm right cerebellar cavernous angioma with central high signal \par and rim of low signal. This finding is also unchanged since 2016. Previous studies with intravenous \par contrast demonstrated there is a developmental venous anomaly/venous angioma associated with the \par cavernoma. There is no evidence of recent hemorrhage in this region.  \par    \par  Susceptibility weighted image 58 series 13 demonstrates a new punctate focus of hemosiderin in the \par left parietal lobe. That finding is not demonstrated on other sequences. There is no high signal on \par T1-weighted imaging to suggest recent hemorrhage. It may represent a new punctate cavernoma. It \par could represent a punctate hemorrhagic infarction that has developed since the previous study. It \par could be related to a normal vessel in a deep sulcus that stands out more on the current study than \par on previous studies related to technique.\par \par 7/18/20 \par MRA brain \par There is no stenosis or occlusion in the distal visualized portion of the left internal carotid \par artery. Again demonstrated is a small linear aneurysm extending medially from the supraclinoid \par portion of the left internal carotid artery aneurysm is again demonstrated to measure approximately \par 3 mm in length (axial image 96 series 8, coronal reconstruction 40 series 106, sagittal \par reconstruction 31 series 107).  \par    \par  The most distal portions of the vertebral arteries are visualized. They appear within limits of \par normal.  The basilar artery is unremarkable.  \par    \par  The visualized proximal portions of the anterior, middle and posterior cerebral arteries appear \par within limits of normal. 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   \par    \par  There is no dilated arterial structure extending to the right posterior temporal/inferior parietal \par cavernoma or the right cerebellar cavernoma/venous angioma. The high signal intensity central \par portion of the right posterior temporal/inferior parietal cavernoma is again demonstrated on this \par study.  \par    \par .\par \par 7/18/20 MRi brain \par Again demonstrated is a right posterior temporal lobe/inferior parietal lobe \par lesion with the typical imaging appearance of a cavernous angioma. There is high signal intensity in\par the central portion of the lesion. There is a thick rim of low signal intensity on T2-weighted \par imaging and "blooming" on susceptibility weighted imaging. Lesion measures 18 mm in AP diameter on \par image 17 series 11. Lesion is not significantly changed since 2016. There is no evidence of recent \par hemorrhage associated with the lesion.  \par    \par  Image 8 series 11 demonstrates a 3 mm right cerebellar cavernous angioma with central high signal \par and rim of low signal. This finding is also unchanged since 2016. Previous studies with intravenous \par contrast demonstrated there is a developmental venous anomaly/venous angioma associated with the \par cavernoma. There is no evidence of recent hemorrhage in this region.  \par    \par  Susceptibility weighted image 58 series 13 demonstrates a new punctate focus of hemosiderin in the \par left parietal lobe. That finding is not demonstrated on other sequences. There is no high signal on \par T1-weighted imaging to suggest recent hemorrhage. It may represent a new punctate cavernoma. It \par could represent a punctate hemorrhagic infarction that has developed since the previous study. It \par could be related to a normal vessel in a deep sulcus that stands out more on the current study than \par on previous studies related to technique.\par \par 7/18/20 \par MRA brain \par There is no stenosis or occlusion in the distal visualized portion of the left internal carotid \par artery. Again demonstrated is a small linear aneurysm extending medially from the supraclinoid \par portion of the left internal carotid artery aneurysm is again demonstrated to measure approximately \par 3 mm in length (axial image 96 series 8, coronal reconstruction 40 series 106, sagittal \par reconstruction 31 series 107).  \par    \par  The most distal portions of the vertebral arteries are visualized. They appear within limits of \par normal.  The basilar artery is unremarkable.  \par    \par  The visualized proximal portions of the anterior, middle and posterior cerebral arteries appear \par within limits of normal. 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   \par    \par  There is no dilated arterial structure extending to the right posterior temporal/inferior parietal \par cavernoma or the right cerebellar cavernoma/venous angioma. The high signal intensity central \par portion of the right posterior temporal/inferior parietal cavernoma is again demonstrated on this \par study.  \par    \par .\par \par \par \par  [de-identified] : 2/9/22 carotid ultrasound\par There is mild intimal thickening in bilateral common carotid arteries. No significant plaque is \par evident on either side and there is no evidence of hemodynamically significant stenosis identified, \par either visually or by velocity measurements. Peak systolic velocity measures up to 86 cm/sec in the \par distal right common carotid artery and 97 cm/sec in the distal right internal carotid artery (ratio \par 1.1), and up to 114 cm/sec in the right external carotid artery. Peak systolic velocity measures up \par to 111 cm/sec in the proximal left common carotid artery and 88 cm/sec in te left internal carotid \par artery (ratio 0.8), and up to 89 cm/sec in the left external carotid artery. Antegrade flow is \par demonstrated in bilateral vertebral arteries, with peak systolic velocity of 44 cm/sec on the right \par and 50 cm/sec on the left\par

## 2022-03-09 NOTE — REVIEW OF SYSTEMS
[Seizures] : convulsions [Chest Pain] : chest pain [Palpitations] : palpitations [Joint Stiffness] : joint stiffness [Hot Flashes] : hot flashes [Muscle Weakness] : muscle weakness [Negative] : Heme/Lymph [Fever] : no fever [Chills] : no chills [Feeling Tired] : not feeling tired [Numbness] : no numbness [Tingling] : no tingling [Dizziness] : no dizziness [Fainting] : no fainting [Vertigo] : no vertigo [Anxiety] : no anxiety [Depression] : no depression [Eye Pain] : no eye pain [Red Eyes] : eyes not red [Loss Of Hearing] : no hearing loss [Shortness Of Breath] : no shortness of breath [Wheezing] : no wheezing [Cough] : no cough [Incontinence] : no incontinence [Joint Pain] : no joint pain [Joint Swelling] : no joint swelling [Easy Bleeding] : no tendency for easy bleeding [Easy Bruising] : no tendency for easy bruising [Swollen Glands] : no swollen glands [de-identified] : Off balance

## 2022-03-09 NOTE — HISTORY OF PRESENT ILLNESS
[FreeTextEntry1] : Soledad Springer is a 71 year old woman with a history of right posterior temporal cavernoma and right cerebellar cavernous/venous anomaly and supraclinoid ICA aneurysm and has yearly MRI/MRA with neurosurgery. She presents today to reestablish care. \par \par MRI/MRA has not had imaging in a few years. Did not see Dr. Vargas. Tenderness to head with light touch-intermittent but has had it for years.  Fall one month ago when stepping off stool- braced fall with arm. No fractures. Right arm cortisone injection to shoulder with relief of pain. \par Reports being off balance since 2020 that improves and reoccurs. Describes walking as veering to right at times. \par \par Numbness and tingling up leg and tingling in left side of face is her presentation for seizures-has not had any seizures that she can recall. Takes Lamictal 100mg daily. Had blood work done with PCP -fatty liver and hyperlipidemia.   \par Previous antiepileptics tried: Gabapentin. \par Saw Dr. Fountain- hepatic cysts. \par \par Saw ENT did not start balance therapy.  \par \par medical history: fatty liver and cysts, renal calculi, cavernomas, and supraclinoid ICA aneurysm, degenerative discs in cervical spine. \par surgical history: hysterectomy due to fibroids. \par Family history: grandmother with stomach aneurysm.\par \par Denies smoking or alcohol use. \par \par Reports generalized weakness. Did not do PT due to COVID-19 pandemic. \par \par \par The remaining neurological review of systems is negative.\par \par 8/26/20\par Soledad Springer is a 69 year old woman with a history of right posterior temporal cavernoma and right cerebellar cavernous/venous anomaly and supraclinoid ICA aneurysm and has yearly MRI/MRA with neurosurgery. She presents today for a consultation for seizures. \par \par She was diagnosed with seizures in 2006. She describes a transient numbness to the left cheek and tingling to the face, arm, and leg on the left side.  These episodes have not changed for the past 10 years.  She previously had an aura of "sour stomach" feeling but no longer has this aura with her seizures.  Ms. Springer was previously on Gabepentin.  In 2010, she had an admission to [facility] with no seizure episodes and reported normal EEG. She was started on Lamictal 100mg ER at that time. She was unable to tolerate Lamictal 200mg due to side effects of fatigue.  Ms. Springer reports a seizure episode this morning. \par \par She has rare episodes of lightheadedness likes she is going to lose consciousness. \par \par She also reports a fullness feeling in her ears. Ms. Springer has not seen ENT. \par \par The remaining neurological review of systems is negative. \par

## 2022-03-29 ENCOUNTER — APPOINTMENT (OUTPATIENT)
Dept: HEART AND VASCULAR | Facility: CLINIC | Age: 72
End: 2022-03-29
Payer: MEDICARE

## 2022-03-29 VITALS
SYSTOLIC BLOOD PRESSURE: 135 MMHG | TEMPERATURE: 98.7 F | WEIGHT: 121 LBS | HEIGHT: 63 IN | OXYGEN SATURATION: 99 % | DIASTOLIC BLOOD PRESSURE: 70 MMHG | HEART RATE: 76 BPM | BODY MASS INDEX: 21.44 KG/M2

## 2022-03-29 PROCEDURE — 99203 OFFICE O/P NEW LOW 30 MIN: CPT

## 2022-03-29 PROCEDURE — 99213 OFFICE O/P EST LOW 20 MIN: CPT

## 2022-03-30 NOTE — HISTORY OF PRESENT ILLNESS
[FreeTextEntry1] : 70 y/o F with PMHx PVCs, Seizures\par Patient of Dr Shoemaker, previously saw Dr Sun\par \par Here for dental clearance prior to tooth extraction w/ IV sedation\par Reports occasional ATKINSON, otherwise feels well\par \par Carotids 2018: Normal\par EKG 1/27/22: HR 81, Ectopic atrial rhythm, PVCs, nonspecific ST-T changes\par Echo 2/18/2022: Normal LVEF 59%,

## 2022-03-30 NOTE — REVIEW OF SYSTEMS
[SOB] : no shortness of breath [Dyspnea on exertion] : dyspnea during exertion [Negative] : Heme/Lymph

## 2022-03-30 NOTE — DISCUSSION/SUMMARY
[___ Month(s)] : in [unfilled] month(s) [FreeTextEntry1] : 72 y/o F with PMHx PVCs, Seizures, ATKINSON\par \par # ATKINSON\par Echo normal\par EKG shows PVCs\par \par # PVCs\par Will consider event monitor if has palpitations\par Continue to monitor\par \par # Pre-op clearance\par No contraindication to proceed with IV sedation for dental procedure

## 2022-04-05 ENCOUNTER — APPOINTMENT (OUTPATIENT)
Dept: NEUROLOGY | Facility: CLINIC | Age: 72
End: 2022-04-05
Payer: MEDICARE

## 2022-04-05 PROCEDURE — 95816 EEG AWAKE AND DROWSY: CPT

## 2022-04-06 PROCEDURE — 95708 EEG WO VID EA 12-26HR UNMNTR: CPT

## 2022-04-06 PROCEDURE — 95700 EEG CONT REC W/VID EEG TECH: CPT

## 2022-04-06 PROCEDURE — 95719 EEG PHYS/QHP EA INCR W/O VID: CPT

## 2022-04-07 ENCOUNTER — APPOINTMENT (OUTPATIENT)
Dept: NEUROLOGY | Facility: CLINIC | Age: 72
End: 2022-04-07

## 2022-04-15 ENCOUNTER — APPOINTMENT (OUTPATIENT)
Dept: HEART AND VASCULAR | Facility: CLINIC | Age: 72
End: 2022-04-15

## 2022-04-15 DIAGNOSIS — I49.8 OTHER SPECIFIED CARDIAC ARRHYTHMIAS: ICD-10-CM

## 2022-04-15 DIAGNOSIS — I67.1 CEREBRAL ANEURYSM, NONRUPTURED: ICD-10-CM

## 2022-04-15 DIAGNOSIS — I49.3 VENTRICULAR PREMATURE DEPOLARIZATION: ICD-10-CM

## 2022-04-15 DIAGNOSIS — I49.1 ATRIAL PREMATURE DEPOLARIZATION: ICD-10-CM

## 2022-04-15 NOTE — REASON FOR VISIT
[Initial Visit] : an initial visit for [Arrhythmia/ECG Abnorrmalities] : arrhythmia/ECG abnormalities [Follow-Up - Clinic] : a clinic follow-up of

## 2022-04-15 NOTE — ASSESSMENT
[FreeTextEntry1] : EKG: July 2020 - NSR, LAE, NSTT\par Apr 2019 - sr with pvs and pacs in bigeminal pattern. septal infarct. \par \par zio patch - pt returned today and will call in 1-2 weeks for results. \par \par carotid ultrasound - normal. \par 2d echo - normal ef - left atrial size moderately enlarged\par Nuclear stress test. change to pharmacologic as pt is unable to tolerate treadmill. \par \par \par Jan 2022\par ekg: \par junctional rhythm with PVC - pt denies palpitations , or syncope. \par pt with fatty liver and uncontrolled lipid panel\par has dizziness - seeing ENT. \par has mild sob on exertion previous pharm stress never done\par order 2d echo\par pharm stress\par carotid u/s\par \par April 2022 -\par 72 yo F\par Cerebral aneurysm -\par Cavernous malformation -\par PAC's -\par PVCs -\par \par Echo (feb 2022) - EF 59% - abnormal diastolic filling. Normal size and function. 83x44 mm hepatic cyst noted. \par Carotid US (feb 2022) - Normal - no obstructive lesions noted\par Holter (july 2020) - 2 runs of SVT (longest 17 beats). Ectopic atrial rhythm present. Junctional rhythm present. Isolated PVCs 1.8%\par \par

## 2022-04-15 NOTE — HISTORY OF PRESENT ILLNESS
[FreeTextEntry1] : 72 y/o F with petit mal seizure and has some previous cardiac arrhythmias who presents for general checkup. \par She is concerned if the pauses are decreasing oxygen to her brain. \par July 16 2020 - NSR, LAE, NSTT - \par \par She has had PVCs in the past.\par \par Last seizure activity was last week. \par she has cavernous  angiomas and needs follow ups with neurosurgery. \par She complains of dizziness and palpitations. \par \par since last visit\par Pt has intermittent sob on exertion. she denies chest pain , orthopnea, pnd, edema. \par carotid normal\par did not have stress test as pt injured her right hip. \par

## 2023-02-07 ENCOUNTER — APPOINTMENT (OUTPATIENT)
Dept: NEUROLOGY | Facility: CLINIC | Age: 73
End: 2023-02-07
Payer: MEDICARE

## 2023-02-07 VITALS
BODY MASS INDEX: 21.09 KG/M2 | DIASTOLIC BLOOD PRESSURE: 55 MMHG | WEIGHT: 119 LBS | HEIGHT: 63 IN | HEART RATE: 67 BPM | SYSTOLIC BLOOD PRESSURE: 139 MMHG

## 2023-02-07 DIAGNOSIS — Q28.3 OTHER MALFORMATIONS OF CEREBRAL VESSELS: ICD-10-CM

## 2023-02-07 DIAGNOSIS — R26.89 OTHER ABNORMALITIES OF GAIT AND MOBILITY: ICD-10-CM

## 2023-02-07 DIAGNOSIS — I67.1 CEREBRAL ANEURYSM, NONRUPTURED: ICD-10-CM

## 2023-02-07 DIAGNOSIS — R51.9 HEADACHE, UNSPECIFIED: ICD-10-CM

## 2023-02-07 PROCEDURE — 99214 OFFICE O/P EST MOD 30 MIN: CPT

## 2023-02-07 RX ORDER — ATORVASTATIN CALCIUM 80 MG/1
TABLET, FILM COATED ORAL
Refills: 0 | Status: DISCONTINUED | COMMUNITY
End: 2023-02-07

## 2023-02-07 NOTE — PHYSICAL EXAM
[FreeTextEntry1] : Mental status:\par Orientation: Alert , oriented to month, day of week, year, location Speech is fluent , able to name objects, repeat a sentence and write a sentence Memory: Short term tested by registering 3 objects and recalled 2/3 in 5 min , 3/3 with hints; Long term memory intact based on correct recall of past events and demographic details. Concentration: able to do serial 7 calculation 1/5 (reports always difficulty with math), able to spell world backwards Comprehension : able follow 3 step requests Apraxia and visuospatial able to draw clock drawing and intersecting pentagon Neglect is absent Agnosia is absent \par MMSE 29/30\par Cranial nerves:\par CN I deferred. CN II VFF; ; III, IV, VI: PERRLA, EOM IV: Facial sensation normal B/L to touch, pinprick and temperature VII:Facial strength, mild left facial weakness B/L. VIII: Gross hearing intact IX, X: palate midline and elevates symmetrically XI: Trapezius normal strength, XII: Tongue midline without atrophy or fasciculation\par Motor: Muscle tone no rigidity or resistance in all 4 extremities. No atrophy or fasciculation. Muscle strength: arms and legs, proximal and distal flexors and extensors are normal 5/5. No UE drift.\par Sensory: intact to pinprick, temperature sensation to vibration and cold. \par Reflexes: all present, normal, and symmetric - diffuse hyperreflexia\par Coordination: finger to nose: normal. Heel to shin: normal\par Gait: steady normal based ; difficulty with tandem and heel, Romberg test negative

## 2023-02-07 NOTE — HISTORY OF PRESENT ILLNESS
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  Previously seen by MD Lucio.\par \par Soledad reports did not follow-up sooner due to on-going stressors of both parents being sick.\par Previously recommended to stop lamotrigine due to findings of fatty liver and hepatic cysts, continues to take medication. EEG completed in April 2022 showed no sign of seizure activity, denies any seizure activity.  She does report having intermittent transient left arm numbness radiating up to left side of face, lasting minutes, approximately happening once a month for the last 3 months.  Denies slurred speech, denies tingling.  Denies tongue biting, LOC, staring / episodes of zoning out, urinary incontinence.   Reports having on-going balance issues since 2020 at times worse than others, feels at times when walking veering to the side.  Denies LOC/ fall.  Saw ENT MD Cabrera last week with no acute findings.  Recommended physical therapy for balance but Soledad is not interested in participating in sessions at this time feels previously went with minimal improvement. On-going tenderness to top of scalp, denies headache, denies visual disturbance, denies fever. \par \par Previously seeing MD Fountain for fatty liver and liver cysts shown on US, recommended MRI Liver - unable to follow-up

## 2023-02-07 NOTE — DATA REVIEWED
[de-identified] : 7/18/20 MRI brain \par Again demonstrated is a right posterior temporal lobe/inferior parietal lobe \par lesion with the typical imaging appearance of a cavernous angioma. There is high signal intensity in\par the central portion of the lesion. There is a thick rim of low signal intensity on T2-weighted \par imaging and "blooming" on susceptibility weighted imaging. Lesion measures 18 mm in AP diameter on \par image 17 series 11. Lesion is not significantly changed since 2016. There is no evidence of recent \par hemorrhage associated with the lesion.  \par    \par  Image 8 series 11 demonstrates a 3 mm right cerebellar cavernous angioma with central high signal \par and rim of low signal. This finding is also unchanged since 2016. Previous studies with intravenous \par contrast demonstrated there is a developmental venous anomaly/venous angioma associated with the \par cavernoma. There is no evidence of recent hemorrhage in this region.  \par    \par  Susceptibility weighted image 58 series 13 demonstrates a new punctate focus of hemosiderin in the \par left parietal lobe. That finding is not demonstrated on other sequences. There is no high signal on \par T1-weighted imaging to suggest recent hemorrhage. It may represent a new punctate cavernoma. It \par could represent a punctate hemorrhagic infarction that has developed since the previous study. It \par could be related to a normal vessel in a deep sulcus that stands out more on the current study than \par on previous studies related to technique.\par \par 7/18/20 \par MRA brain \par There is no stenosis or occlusion in the distal visualized portion of the left internal carotid \par artery. Again demonstrated is a small linear aneurysm extending medially from the supraclinoid \par portion of the left internal carotid artery aneurysm is again demonstrated to measure approximately \par 3 mm in length (axial image 96 series 8, coronal reconstruction 40 series 106, sagittal \par reconstruction 31 series 107).  \par    \par  The most distal portions of the vertebral arteries are visualized. They appear within limits of \par normal.  The basilar artery is unremarkable.  \par    \par  The visualized proximal portions of the anterior, middle and posterior cerebral arteries appear \par within limits of normal. 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   \par    \par  There is no dilated arterial structure extending to the right posterior temporal/inferior parietal \par cavernoma or the right cerebellar cavernoma/venous angioma. The high signal intensity central \par portion of the right posterior temporal/inferior parietal cavernoma is again demonstrated on this \par study.  \par    \par .\par \par 7/18/20 MRi brain \par Again demonstrated is a right posterior temporal lobe/inferior parietal lobe \par lesion with the typical imaging appearance of a cavernous angioma. There is high signal intensity in\par the central portion of the lesion. There is a thick rim of low signal intensity on T2-weighted \par imaging and "blooming" on susceptibility weighted imaging. Lesion measures 18 mm in AP diameter on \par image 17 series 11. Lesion is not significantly changed since 2016. There is no evidence of recent \par hemorrhage associated with the lesion.  \par    \par  Image 8 series 11 demonstrates a 3 mm right cerebellar cavernous angioma with central high signal \par and rim of low signal. This finding is also unchanged since 2016. Previous studies with intravenous \par contrast demonstrated there is a developmental venous anomaly/venous angioma associated with the \par cavernoma. There is no evidence of recent hemorrhage in this region.  \par    \par  Susceptibility weighted image 58 series 13 demonstrates a new punctate focus of hemosiderin in the \par left parietal lobe. That finding is not demonstrated on other sequences. There is no high signal on \par T1-weighted imaging to suggest recent hemorrhage. It may represent a new punctate cavernoma. It \par could represent a punctate hemorrhagic infarction that has developed since the previous study. It \par could be related to a normal vessel in a deep sulcus that stands out more on the current study than \par on previous studies related to technique.\par \par 7/18/20 \par MRA brain \par There is no stenosis or occlusion in the distal visualized portion of the left internal carotid \par artery. Again demonstrated is a small linear aneurysm extending medially from the supraclinoid \par portion of the left internal carotid artery aneurysm is again demonstrated to measure approximately \par 3 mm in length (axial image 96 series 8, coronal reconstruction 40 series 106, sagittal \par reconstruction 31 series 107).  \par    \par  The most distal portions of the vertebral arteries are visualized. They appear within limits of \par normal.  The basilar artery is unremarkable.  \par    \par  The visualized proximal portions of the anterior, middle and posterior cerebral arteries appear \par within limits of normal. 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   \par    \par  There is no dilated arterial structure extending to the right posterior temporal/inferior parietal \par cavernoma or the right cerebellar cavernoma/venous angioma. The high signal intensity central \par portion of the right posterior temporal/inferior parietal cavernoma is again demonstrated on this \par study.  \par    \par .\par \par \par \par  [de-identified] : 4/2022- normal outpatient ambulatory EEG study, no findings of active epilepsy. [de-identified] : 2/9/22 carotid ultrasound\par There is mild intimal thickening in bilateral common carotid arteries. No significant plaque is \par evident on either side and there is no evidence of hemodynamically significant stenosis identified, \par either visually or by velocity measurements. Peak systolic velocity measures up to 86 cm/sec in the \par distal right common carotid artery and 97 cm/sec in the distal right internal carotid artery (ratio \par 1.1), and up to 114 cm/sec in the right external carotid artery. Peak systolic velocity measures up \par to 111 cm/sec in the proximal left common carotid artery and 88 cm/sec in te left internal carotid \par artery (ratio 0.8), and up to 89 cm/sec in the left external carotid artery. Antegrade flow is \par demonstrated in bilateral vertebral arteries, with peak systolic velocity of 44 cm/sec on the right \par and 50 cm/sec on the left\par

## 2023-03-02 NOTE — REVIEW OF SYSTEMS
Appointment made for 03/08/23  
Reason for Call:  Other appointment    Detailed comments: [facility] calling to state that Patient is currently in [facility] and not sure if she will make it back for the Dr. Zavala appointment. Wondering if Presley wants to call [facility] to find out of they can do debridement while she is in hospital? Loy so didn't have phone number but said we can call hospital and she should be able to be found?? If we have questions for Loy so call [phone]      Phone Number Patient can be reached at: Home number    Best Time: any    Can we leave a detailed message on this number? YES    Call taken on 2/24/2023 at 1:17 PM by Desirae Glasgow      
[Negative] : Heme/Lymph

## 2023-03-06 ENCOUNTER — OFFICE (OUTPATIENT)
Dept: URBAN - METROPOLITAN AREA CLINIC 121 | Facility: CLINIC | Age: 73
Setting detail: OPHTHALMOLOGY
End: 2023-03-06
Payer: MEDICARE

## 2023-03-06 DIAGNOSIS — Z96.1: ICD-10-CM

## 2023-03-06 DIAGNOSIS — H16.223: ICD-10-CM

## 2023-03-06 DIAGNOSIS — H40.013: ICD-10-CM

## 2023-03-06 PROCEDURE — 92012 INTRM OPH EXAM EST PATIENT: CPT | Performed by: OPHTHALMOLOGY

## 2023-03-06 ASSESSMENT — REFRACTION_CURRENTRX
OS_SPHERE: +1.50
OS_CYLINDER: PLANO
OD_OVR_VA: 20/
OD_SPHERE: +1.50
OD_AXIS: 180
OS_OVR_VA: 20/
OS_AXIS: 180
OD_CYLINDER: PLANO

## 2023-03-06 ASSESSMENT — CONFRONTATIONAL VISUAL FIELD TEST (CVF)
OD_FINDINGS: FULL
OS_FINDINGS: FULL

## 2023-03-06 ASSESSMENT — TONOMETRY
OD_IOP_MMHG: 18
OS_IOP_MMHG: 18

## 2023-03-06 ASSESSMENT — KERATOMETRY
OD_K2POWER_DIOPTERS: 45.25
OS_K2POWER_DIOPTERS: 45.00
OD_K1POWER_DIOPTERS: 43.50
OS_AXISANGLE_DEGREES: 110
METHOD_AUTO_MANUAL: AUTO
OD_AXISANGLE_DEGREES: 079
OS_K1POWER_DIOPTERS: 44.00

## 2023-03-06 ASSESSMENT — SUPERFICIAL PUNCTATE KERATITIS (SPK)
OD_SPK: 1+
OS_SPK: 1+

## 2023-03-06 ASSESSMENT — REFRACTION_AUTOREFRACTION
OD_SPHERE: -0.25
OD_AXIS: 059
OS_AXIS: 131
OS_SPHERE: 0.00
OD_CYLINDER: +0.75
OS_CYLINDER: +1.00

## 2023-03-06 ASSESSMENT — AXIALLENGTH_DERIVED
OD_AL: 23.2275
OS_AL: 23.0429

## 2023-03-06 ASSESSMENT — VISUAL ACUITY
OD_BCVA: 20/20
OS_BCVA: 20/25

## 2023-03-06 ASSESSMENT — PACHYMETRY
OS_CT_UM: 606
OD_CT_UM: 614
OD_CT_CORRECTION: -5
OS_CT_CORRECTION: -4

## 2023-03-06 ASSESSMENT — SPHEQUIV_DERIVED
OS_SPHEQUIV: 0.5
OD_SPHEQUIV: 0.125

## 2023-03-07 ENCOUNTER — NON-APPOINTMENT (OUTPATIENT)
Age: 73
End: 2023-03-07

## 2023-05-02 ENCOUNTER — APPOINTMENT (OUTPATIENT)
Dept: NEUROLOGY | Facility: CLINIC | Age: 73
End: 2023-05-02

## 2023-05-06 ENCOUNTER — TRANSCRIPTION ENCOUNTER (OUTPATIENT)
Age: 73
End: 2023-05-06

## 2023-05-30 ENCOUNTER — APPOINTMENT (OUTPATIENT)
Dept: NEUROSURGERY | Facility: CLINIC | Age: 73
End: 2023-05-30
Payer: MEDICARE

## 2023-05-30 PROCEDURE — 99205 OFFICE O/P NEW HI 60 MIN: CPT | Mod: 95

## 2023-05-30 NOTE — DATA REVIEWED
[de-identified] : MRI BRAIN WITH AND WITHOUT CONTRAST: 5/1/23: IMPRESSION- No acute infarct. \par 2.   Right temporoparietal lesion measuring up to 1.7 cm in diameter as \par described above is most compatible with a cavernous malformation. \par 3.   Right cerebellar 4 mm lesion as described above most compatible with small \par cavernous malformation with associated small developmental venous anomaly that \par drains into the right transverse sinus. \par 4.   Mild periventricular and subcortical white matter hypodensities compatible \par with changes of mild burden chronic small-vessel disease.\par MRI BRAIN WITH AND WITHOUT CONTRAST: 7/18/2020: IMPRESSION- No significant change in right posterior temporal/inferior parietal cavernoma. No\par significant change in right cerebellar cavernoma with associated developmental venous anomaly/venous\par angioma.\par \par There is a punctate new focus of susceptibility artifact in the left parietal lobe that could\par represent a punctate new cavernous angioma or a punctate area of hemorrhage that has developed since\par the previous study. It could represent a normal vessel in a deep sulcus that stands out more\par prominently than on previous studies related to slice locations/technical difference.\par \par See separate report MR angiography regarding small left supraclinoid carotid artery aneurysm.\par \par No significant change in scattered foci of small vessel ischemia/infarction since most recent study\par 4/9/2019. Foci have mildly increased since 2016. No MRI evidence of recent infarction. [de-identified] : MRA BRAIN: 5/1/23: IMPRESSION- 1.   Redemonstration of similar-appearing small left supraclinoid carotid \par artery aneurysm as described above. \par 2.   There appears to be mild (less than 50%) stenosis of the ophthalmic and \par communicating segments of the left ICA. \par 3.   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 \par 4.   Right temporoparietal cavernous malformation. See MRI brain report for \par details\par MRA BRAIN: 7/48/20: IMPRESSION- No significant change in small left supraclinoid carotid artery aneurysm.

## 2023-05-30 NOTE — ASSESSMENT
[FreeTextEntry1] : Ms. 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 MRI brain 5/1/23 reviewed independently by me demonstrates a right temporoparietal, periatrial lesion measuring up to 1.7 cm in diameter most consistent with a cavernous malformation and a right cerebellar medial hemispheric, approximately 4 mm lesion most consistent with a cavernous malformation with an associated developmental venous anomaly that appears to drain into the right transverse sinus. Findings have remained stable when compared directly to imaging dating back to 2016, including imaging studies from 2018, 2019, and 2020. MRA 5/1/23 reviewed independently by me demonstrates a small, <3mm left distal cavernous segment versus proximal ophthalmic segment  internal carotid artery aneurysm which has remained unchanged when compared directly with imaging dating back to 2016.\par \par Natural history and alternative management strategies discussed. Consensus cerebrovascular team recommendation continues to be annual MRA surveillance for the left internal carotid artery aneurysm and for the small right medial cerebellar hemispheric cavernous malformation. \par \par I agree with the original comprehensive epilepsy and cerebrovascular team recommendation for surgical resection of the right temporoparietal, periatrial cavernous malformation. I explained that the goals of surgery include minimizing the risk of intracerebral hemorrhage and to reduce the risk of recurrent seizures, potentially allowing for the reduction and eventual weaning of antiseizure medication. Risks of surgery including but not limited to bleeding, infection, vascular injury, brain hemorrhage, stroke, coma/death, weakness/paralysis, visual/sensory loss, loss of speech/language/cognitive/memory function, failure to resect, failure to improve seizure control, need for subsequent procedures, recurrence discussed. The patient understands risks, benefits, and alternatives, and is considering my recommendations.\par \par I have made myself available for further discussion in person, and have recommended that we meet to go over the imaging and recommendations together in person. If she decides to proceed, then we will schedule her for right craniotomy for resection of cavernous malformation with frameless stereotactic navigation and intraoperative ultrasound in the near future. She will require a frameless protocol CT head without contrast for navigation prior to surgery. If the patient wishes not to proceed with surgery, I have recommended a surveillance MRI brain with and without gadolinium in 6 months with an appointment to follow. I have made myself available for further discussion with her if she wishes. However, she understands my recommendation is to proceed with surgical resection.\par \par Of note, we continue to recommend annual MRA brain for surveillance of her unruptured left internal carotid artery aneurysm. \par \par I have asked the patient to contact me for any symptomatic development or progression in the interim at which time we can obtain expedited follow up imaging, or for any additional questions or concerns that arise. \par \par A total of 60 minutes were spent relative to this encounter. \par

## 2023-05-30 NOTE — HISTORY OF PRESENT ILLNESS
[de-identified] : Ms. Springer has agreed to two-way audiovisual telehealth consultation. She is located at her home [address] [place] [address] and I am located at my office at [facility].\par \par Ms. Springer presents with a history of cervical radiculopathy, anxiety, GERD and who presented to the emergency department at [facility] 5/3/23 for an episode of vertigo. Symptoms worsen with different head positions and are often associated with gait instability. Patient reports a history of seizure disorder since 2005, at which time she was diagnosed with a right tempoparietal cavernous malformation that then had been followed conservatively by neurology (by report and by patient interview). In addition, patient has a known right cerebellar cavernous malformation and a small (<3mm) left distal cavernous versus proximal ophthalmic segment internal carotid artery aneurysm. Patient had previously been evaluated by interventional neurologist, Dr. Js Bean, in October 2019. Recommendation at that time was for surgical resection of the right temporoparietal cavernous malformation (consensus recommendation of epilepsy team), but the patient declined and proceeded with surveillance imaging. In addition, consensus cerebrovascular recommendation for the small left internal carotid artery aneurysm was for annual MRA surveillance. There has no been documented follow up with neurosurgery since that time. \par \par Patient states seizures have been well controlled with AEDs over the course of several years. When she does not take her medications, she will feel off balance with left sided hemisensory symptoms. Symptoms described as primarily left sided facial numbness, primarily occurring near the mandibular region and progressing to involve the left periauricular region. Patient denies other neurological symptoms. \par \par Off note, patient's father and paternal grandmother have a history of aneurysms. \par

## 2023-10-13 ENCOUNTER — OFFICE (OUTPATIENT)
Dept: URBAN - METROPOLITAN AREA CLINIC 121 | Facility: CLINIC | Age: 73
Setting detail: OPHTHALMOLOGY
End: 2023-10-13
Payer: MEDICARE

## 2023-10-13 DIAGNOSIS — H40.013: ICD-10-CM

## 2023-10-13 PROCEDURE — 92133 CPTRZD OPH DX IMG PST SGM ON: CPT | Performed by: OPHTHALMOLOGY

## 2023-10-13 PROCEDURE — 99212 OFFICE O/P EST SF 10 MIN: CPT | Performed by: OPHTHALMOLOGY

## 2023-10-13 PROCEDURE — 92083 EXTENDED VISUAL FIELD XM: CPT | Performed by: OPHTHALMOLOGY

## 2023-10-13 ASSESSMENT — SUPERFICIAL PUNCTATE KERATITIS (SPK)
OD_SPK: 1+
OS_SPK: 1+

## 2023-10-13 ASSESSMENT — CONFRONTATIONAL VISUAL FIELD TEST (CVF)
OD_FINDINGS: FULL
OS_FINDINGS: FULL

## 2023-10-17 ASSESSMENT — REFRACTION_AUTOREFRACTION
OD_SPHERE: -0.25
OD_AXIS: 059
OS_AXIS: 131
OS_CYLINDER: +1.00
OD_CYLINDER: +0.75
OS_SPHERE: 0.00

## 2023-10-17 ASSESSMENT — REFRACTION_CURRENTRX
OS_CYLINDER: PLANO
OD_SPHERE: +1.50
OS_AXIS: 180
OS_OVR_VA: 20/
OD_AXIS: 180
OD_OVR_VA: 20/
OD_CYLINDER: PLANO
OS_SPHERE: +1.50

## 2023-10-17 ASSESSMENT — KERATOMETRY
OD_AXISANGLE_DEGREES: 079
OD_K2POWER_DIOPTERS: 45.25
OS_K1POWER_DIOPTERS: 44.00
OS_AXISANGLE_DEGREES: 110
OS_K2POWER_DIOPTERS: 45.00
METHOD_AUTO_MANUAL: AUTO
OD_K1POWER_DIOPTERS: 43.50

## 2023-10-17 ASSESSMENT — VISUAL ACUITY
OS_BCVA: 20/25
OD_BCVA: 20/20

## 2023-10-17 ASSESSMENT — AXIALLENGTH_DERIVED
OS_AL: 23.0429
OD_AL: 23.2275

## 2023-10-17 ASSESSMENT — SPHEQUIV_DERIVED
OS_SPHEQUIV: 0.5
OD_SPHEQUIV: 0.125

## 2023-10-31 ENCOUNTER — APPOINTMENT (OUTPATIENT)
Dept: NEUROLOGY | Facility: CLINIC | Age: 73
End: 2023-10-31
Payer: MEDICARE

## 2023-10-31 VITALS
HEIGHT: 63 IN | BODY MASS INDEX: 21.26 KG/M2 | HEART RATE: 65 BPM | OXYGEN SATURATION: 98 % | WEIGHT: 120 LBS | SYSTOLIC BLOOD PRESSURE: 137 MMHG | DIASTOLIC BLOOD PRESSURE: 86 MMHG

## 2023-10-31 DIAGNOSIS — R29.2 ABNORMAL REFLEX: ICD-10-CM

## 2023-10-31 DIAGNOSIS — K76.89 OTHER SPECIFIED DISEASES OF LIVER: ICD-10-CM

## 2023-10-31 DIAGNOSIS — R25.3 FASCICULATION: ICD-10-CM

## 2023-10-31 DIAGNOSIS — R41.3 OTHER AMNESIA: ICD-10-CM

## 2023-10-31 DIAGNOSIS — G40.909 EPILEPSY, UNSPECIFIED, NOT INTRACTABLE, W/OUT STATUS EPILEPTICUS: ICD-10-CM

## 2023-10-31 PROCEDURE — 99215 OFFICE O/P EST HI 40 MIN: CPT

## 2023-10-31 RX ORDER — GINGER ROOT
POWDER (GRAM) MISCELLANEOUS
Refills: 0 | Status: DISCONTINUED | COMMUNITY
Start: 2022-02-04 | End: 2023-10-31

## 2023-10-31 RX ORDER — LEVETIRACETAM 500 MG/1
500 TABLET, FILM COATED ORAL
Qty: 60 | Refills: 1 | Status: DISCONTINUED | COMMUNITY
Start: 2023-02-07 | End: 2023-10-31

## 2023-11-01 RX ORDER — VITAMIN K2 90 MCG
1000 CAPSULE ORAL
Refills: 0 | Status: ACTIVE | COMMUNITY

## 2023-11-03 ENCOUNTER — RESULT REVIEW (OUTPATIENT)
Age: 73
End: 2023-11-03

## 2023-11-03 ENCOUNTER — LABORATORY RESULT (OUTPATIENT)
Age: 73
End: 2023-11-03

## 2023-11-06 ENCOUNTER — NON-APPOINTMENT (OUTPATIENT)
Age: 73
End: 2023-11-06

## 2023-11-06 PROBLEM — G40.909 SEIZURE DISORDER: Status: ACTIVE | Noted: 2019-03-29

## 2023-11-07 LAB
ALDOLASE SERPL-CCNC: 4.7 U/L
CHOLEST SERPL-MCNC: 211 MG/DL
CK SERPL-CCNC: 47 U/L
CRP SERPL-MCNC: <3 MG/L
ERYTHROCYTE [SEDIMENTATION RATE] IN BLOOD BY WESTERGREN METHOD: 34 MM/HR
HCYS SERPL-MCNC: 8.7 UMOL/L
HDLC SERPL-MCNC: 100 MG/DL
LAMOTRIGINE SERPL-MCNC: 3.1 UG/ML
LDH SERPL-CCNC: 165 U/L
LDLC SERPL CALC-MCNC: 102 MG/DL
NONHDLC SERPL-MCNC: 111 MG/DL
T PALLIDUM AB SER QL IA: NEGATIVE
TRIGL SERPL-MCNC: 48 MG/DL
TSH SERPL-ACNC: 0.96 UIU/ML
VIT B12 SERPL-MCNC: 921 PG/ML

## 2023-11-14 ENCOUNTER — APPOINTMENT (OUTPATIENT)
Dept: NEUROLOGY | Facility: CLINIC | Age: 73
End: 2023-11-14

## 2023-11-15 LAB
COPPER SERPL-MCNC: 99 UG/DL
ZINC SERPL-MCNC: 106 UG/DL

## 2024-02-23 ENCOUNTER — APPOINTMENT (OUTPATIENT)
Dept: NEUROLOGY | Facility: CLINIC | Age: 74
End: 2024-02-23

## 2024-03-20 ENCOUNTER — OFFICE (OUTPATIENT)
Dept: URBAN - METROPOLITAN AREA CLINIC 121 | Facility: CLINIC | Age: 74
Setting detail: OPHTHALMOLOGY
End: 2024-03-20
Payer: MEDICARE

## 2024-03-20 DIAGNOSIS — H16.223: ICD-10-CM

## 2024-03-20 DIAGNOSIS — H40.013: ICD-10-CM

## 2024-03-20 DIAGNOSIS — Z96.1: ICD-10-CM

## 2024-03-20 PROCEDURE — 92014 COMPRE OPH EXAM EST PT 1/>: CPT | Performed by: OPHTHALMOLOGY

## 2024-03-20 ASSESSMENT — REFRACTION_CURRENTRX
OD_OVR_VA: 20/
OD_AXIS: 180
OD_CYLINDER: PLANO
OD_SPHERE: +1.50
OS_CYLINDER: PLANO
OS_OVR_VA: 20/
OS_SPHERE: +1.50
OS_AXIS: 180

## 2024-06-21 RX ORDER — LAMOTRIGINE 25 MG/1
25 TABLET ORAL
Qty: 120 | Refills: 0 | Status: ACTIVE | COMMUNITY
Start: 1900-01-01 | End: 1900-01-01

## 2024-08-27 ENCOUNTER — APPOINTMENT (OUTPATIENT)
Dept: NEUROLOGY | Facility: CLINIC | Age: 74
End: 2024-08-27

## 2024-09-09 ENCOUNTER — APPOINTMENT (OUTPATIENT)
Dept: NEUROLOGY | Facility: CLINIC | Age: 74
End: 2024-09-09
Payer: MEDICARE

## 2024-09-09 VITALS
SYSTOLIC BLOOD PRESSURE: 148 MMHG | DIASTOLIC BLOOD PRESSURE: 84 MMHG | HEART RATE: 63 BPM | WEIGHT: 121 LBS | BODY MASS INDEX: 21.43 KG/M2 | OXYGEN SATURATION: 99 %

## 2024-09-09 PROCEDURE — 99214 OFFICE O/P EST MOD 30 MIN: CPT

## 2024-09-09 RX ORDER — LACOSAMIDE 50 MG/1
50 TABLET ORAL
Qty: 60 | Refills: 3 | Status: ACTIVE | COMMUNITY
Start: 2024-09-09 | End: 1900-01-01

## 2024-09-09 NOTE — PHYSICAL EXAM
[FreeTextEntry1] : Mental status: Orientation: Alert , oriented to month, day of week, year, location Speech is fluent , able to name objects, repeat a sentence and follow verbal requests Cranial nerves: CN I deferred. CN II VFF; ; III, IV, VI: PERRLA, EOM IV: Facial sensation normal B/L to touch, pinprick and temperature VII:Facial strength, mild left facial weakness  VIII: Gross hearing intact IX, X: palate midline and elevates symmetrically XI: Trapezius normal strength, XII: Tongue midline without atrophy or fasciculation Motor: Muscle tone no rigidity or resistance in all 4 extremities. No atrophy or fasciculation. Muscle strength: arms and legs, proximal and distal flexors and extensors are normal 5/5. No UE drift. Sensory: intact to pinprick, temperature sensation to vibration and cold.  Reflexes: all present, normal, and symmetric - diffuse hyperreflexia Coordination: finger to nose: normal. Heel to shin: normal Gait: steady normal based ; difficulty with tandem and heel, Romberg test negative

## 2024-09-09 NOTE — HISTORY OF PRESENT ILLNESS
[FreeTextEntry1] : Soledad Amador is here in follow-up.  Started developing left hemisensory symptoms which was correlated with her simple partial epilepsy in the past.   Continues to be on lamotrigine 50mg BID - no reported side effects. Reluctant to switch to Keppra as she thinks she tried it in the past and she did not like side effects.   Followed up with MD Reyez neurosurgery regarding right temporoparietal, periatrial lesion measuring up to 1.7 cm in diameter most consistent with a cavernous malformation and a right cerebellar medial hemispheric, approximately 4 mm lesion most consistent with a cavernous malformation  Most recent MRI Brain and MRA 5/2023 findings remain stable and unchanged She is not interested in surgical resection at this time will continue to follow-up with neurosurgery with annual surveillance imaging, PT for possible hip replacement for gait and balance 2 x weekly, water aerobics she will be starting in the near future. water aerobics ordered will be starting   Previously recommended to stop lamotrigine due to findings of fatty liver and hepatic cysts, continues to take medication. EEG completed in April 2022 showed no sign of seizure activity, denies any seizure activity.  She does report having intermittent transient left arm numbness radiating up to left side of face, lasting minutes, approximately happening once a month for the last 3 months.  Denies slurred speech, denies tingling.  Denies tongue biting, LOC, staring / episodes of zoning out, urinary incontinence.   Reports having on-going balance issues since 2020 at times worse than others, feels at times when walking veering to the side.  Denies LOC/ fall.  Saw ENT MD Cabrera last week with no acute findings.  Recommended physical therapy for balance but Soledad is not interested in participating in sessions at this time feels previously went with minimal improvement. On-going tenderness to top of scalp, denies headache, denies visual disturbance, denies fever.   Previously seeing MD Fountain for fatty liver and liver cysts shown on US, recommended MRI Liver - unable to follow-up

## 2024-09-09 NOTE — DATA REVIEWED
[de-identified] : MRA Brain 5/1/23-   FINDINGS:  Brain: No acute infarct. Right temporoparietal lesion measuring up to approximately 1.7 cm in diameter with mixed heterogenous T1 and T2 signal along  with susceptibility effect on the SWI sequences. There is a smaller similar appearing subtle lesion with susceptibility effect series 21, image 22 within  the right lobe of the cerebellum measuring approximately 4 mm in diameter with associated small developmental venous anomaly that drains into the right transverse sinus on series 24, image 9. Mild periventricular and subcortical white matter hyperintensities compatible with changes of mild burden chronic small-vessel disease.  Cerebral ventricles: The ventricular system is within normal limits of variation for the patient's age.  Bones/joints: No acute fracture.  Paranasal sinuses: The visualized paranasal sinuses are unopacified. No findings to suggest acute sinusitis.  Mastoid air cells: The mastoid air cells are within normal limits of variation. No mastoid effusion.  Orbital cavities: Unremarkable.  Soft tissues: Unremarkable.      IMPRESSION:  1. No acute infarct.  2. Right temporoparietal lesion measuring up to 1.7 cm in diameter as described above is most compatible with a cavernous malformation.  3. Right cerebellar 4 mm lesion as described above most compatible with small cavernous malformation with associated small developmental venous anomaly that drains into the right transverse sinus.  4. Mild periventricular and subcortical white matter hypodensities compatible with changes of mild burden chronic small-vessel disease.     MRA Brain 5/1/23-   FINDINGS:    ANTERIOR CIRCULATION:  Right internal carotid artery: Intracranial segment is patent with no significant stenosis. No aneurysm.  Right middle cerebral artery: No occlusion or significant stenosis. No aneurysm.  Right anterior cerebral artery: No occlusion or significant stenosis. No aneurysm.    Left internal carotid artery: There appears to be mild (less than 50%) stenosis of the ophthalmic and communicating segments of the left ICA. Redemonstration of similar-appearing small linear aneurysm extending medially from the  supraclinoid left internal carotid artery, again measuring approximately 3 mm in diameter (series 7, image 112).  Left middle cerebral artery: No occlusion or significant stenosis. No aneurysm.  Left anterior cerebral artery: No occlusion or significant stenosis. No aneurysm.    POSTERIOR CIRCULATION:  Right vertebral artery: No occlusion or significant stenosis. No aneurysm.  Left vertebral artery: The left vertebral artery is dominant. No occlusion or significant stenosis. No aneurysm.  Basilar artery: No occlusion or significant stenosis. No aneurysm.  Right posterior cerebral artery: Fetal origin of the right posterior cerebral artery / hypoplastic P1 segment. No occlusion or significant stenosis. No  aneurysm.  Left posterior cerebral artery: No occlusion or significant stenosis. No aneurysm.    Brain: Right temporoparietal cavernous malformation. See MRI brain report for details.    IMPRESSION:  1. Redemonstration of similar-appearing small left supraclinoid carotid artery aneurysm as described above.  2. There appears to be mild (less than 50%) stenosis of the ophthalmic and communicating segments of the left ICA.  3. Fetal origin of the right posterior cerebral artery / hypoplastic P1 segment.  4. Right temporoparietal cavernous malformation. See MRI brain report for details.     7/18/20 MRI brain  Again demonstrated is a right posterior temporal lobe/inferior parietal lobe lesion with the typical imaging appearance of a cavernous angioma. There is high signal intensity in the central portion of the lesion. There is a thick rim of low signal intensity on T2-weighted imaging and "blooming" on susceptibility weighted imaging. Lesion measures 18 mm in AP diameter on image 17 series 11. Lesion is not significantly changed since 2016. There is no evidence of recent hemorrhage associated with the lesion.       Image 8 series 11 demonstrates a 3 mm right cerebellar cavernous angioma with central high signal and rim of low signal. This finding is also unchanged since 2016. Previous studies with intravenous contrast demonstrated there is a developmental venous anomaly/venous angioma associated with the cavernoma. There is no evidence of recent hemorrhage in this region.       Susceptibility weighted image 58 series 13 demonstrates a new punctate focus of hemosiderin in the left parietal lobe. That finding is not demonstrated on other sequences. There is no high signal on T1-weighted imaging to suggest recent hemorrhage. It may represent a new punctate cavernoma. It  could represent a punctate hemorrhagic infarction that has developed since the previous study. It could be related to a normal vessel in a deep sulcus that stands out more on the current study than on previous studies related to technique.  7/18/20  MRA brain  There is no stenosis or occlusion in the distal visualized portion of the left internal carotid artery. Again demonstrated is a small linear aneurysm extending medially from the supraclinoid portion of the left internal carotid artery aneurysm is again demonstrated to measure approximately  3 mm in length (axial image 96 series 8, coronal reconstruction 40 series 106, sagittal  reconstruction 31 series 107).       The most distal portions of the vertebral arteries are visualized. They appear within limits of normal.  The basilar artery is unremarkable.        The visualized proximal portions of the anterior, middle and posterior cerebral arteries appear  within limits of normal. Again demonstrated is a mildly hypoplastic right P1 segment related to  congenital variation with a larger right posterior communicating artery than left posterior  communicating artery.         There is no dilated arterial structure extending to the right posterior temporal/inferior parietal  cavernoma or the right cerebellar cavernoma/venous angioma. The high signal intensity central  portion of the right posterior temporal/inferior parietal cavernoma is again demonstrated on this  study.       .  7/18/20 MRi brain  Again demonstrated is a right posterior temporal lobe/inferior parietal lobe  lesion with the typical imaging appearance of a cavernous angioma. There is high signal intensity in the central portion of the lesion. There is a thick rim of low signal intensity on T2-weighted  imaging and "blooming" on susceptibility weighted imaging. Lesion measures 18 mm in AP diameter on  image 17 series 11. Lesion is not significantly changed since 2016. There is no evidence of recent  hemorrhage associated with the lesion.        Image 8 series 11 demonstrates a 3 mm right cerebellar cavernous angioma with central high signal  and rim of low signal. This finding is also unchanged since 2016. Previous studies with intravenous  contrast demonstrated there is a developmental venous anomaly/venous angioma associated with the  cavernoma. There is no evidence of recent hemorrhage in this region.        Susceptibility weighted image 58 series 13 demonstrates a new punctate focus of hemosiderin in the  left parietal lobe. That finding is not demonstrated on other sequences. There is no high signal on  T1-weighted imaging to suggest recent hemorrhage. It may represent a new punctate cavernoma. It  could represent a punctate hemorrhagic infarction that has developed since the previous study. It  could be related to a normal vessel in a deep sulcus that stands out more on the current study than  on previous studies related to technique.  7/18/20  MRA brain  There is no stenosis or occlusion in the distal visualized portion of the left internal carotid  artery. Again demonstrated is a small linear aneurysm extending medially from the supraclinoid  portion of the left internal carotid artery aneurysm is again demonstrated to measure approximately  3 mm in length (axial image 96 series 8, coronal reconstruction 40 series 106, sagittal  reconstruction 31 series 107).        The most distal portions of the vertebral arteries are visualized. They appear within limits of  normal.  The basilar artery is unremarkable.        The visualized proximal portions of the anterior, middle and posterior cerebral arteries appear  within limits of normal. Again demonstrated is a mildly hypoplastic right P1 segment related to  congenital variation with a larger right posterior communicating artery than left posterior  communicating artery.         There is no dilated arterial structure extending to the right posterior temporal/inferior parietal  cavernoma or the right cerebellar cavernoma/venous angioma. The high signal intensity central  portion of the right posterior temporal/inferior parietal cavernoma is again demonstrated on this  study.       .     [de-identified] : 4/2022- normal outpatient ambulatory EEG study, no findings of active epilepsy.  8/24 EKG SNR with Qtc< 400  [de-identified] : 2/9/22 carotid ultrasound\par  There is mild intimal thickening in bilateral common carotid arteries. No significant plaque is \par  evident on either side and there is no evidence of hemodynamically significant stenosis identified, \par  either visually or by velocity measurements. Peak systolic velocity measures up to 86 cm/sec in the \par  distal right common carotid artery and 97 cm/sec in the distal right internal carotid artery (ratio \par  1.1), and up to 114 cm/sec in the right external carotid artery. Peak systolic velocity measures up \par  to 111 cm/sec in the proximal left common carotid artery and 88 cm/sec in te left internal carotid \par  artery (ratio 0.8), and up to 89 cm/sec in the left external carotid artery. Antegrade flow is \par  demonstrated in bilateral vertebral arteries, with peak systolic velocity of 44 cm/sec on the right \par  and 50 cm/sec on the left\par

## 2024-09-09 NOTE — ASSESSMENT
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  Openly discussed concerns that she is not following up on medical recommendations.  We have repeatedly ask her to taper down Lamictal due to unresolved liver issues that she has not followed through on. She admits she is scared to taper down Lamictal but I told her at such a low dose it is unlikely offering any seizure protection.  She states she feels dizzy when she misses a dose so I suggest slow taper of Lamictal over one week starting with first eliminating evening dose  Simultaneously we will be staring Vimpat 50 mg BID with plan to titrate up to 100 BID at next visit once she is off the Lamictal  Her father is now in a facility so I encouraged her to follow up on her own medical issues she has been neglecting .   She will follow up with [facility] in one month.

## 2025-01-27 ENCOUNTER — OFFICE (OUTPATIENT)
Facility: LOCATION | Age: 75
Setting detail: OPHTHALMOLOGY
End: 2025-01-27
Payer: COMMERCIAL

## 2025-01-27 DIAGNOSIS — Z96.1: ICD-10-CM

## 2025-01-27 DIAGNOSIS — H16.223: ICD-10-CM

## 2025-01-27 DIAGNOSIS — H40.013: ICD-10-CM

## 2025-01-27 PROCEDURE — 92250 FUNDUS PHOTOGRAPHY W/I&R: CPT | Performed by: OPHTHALMOLOGY

## 2025-01-27 PROCEDURE — 99214 OFFICE O/P EST MOD 30 MIN: CPT | Performed by: OPHTHALMOLOGY

## 2025-01-27 ASSESSMENT — PACHYMETRY
OS_CT_CORRECTION: -4
OD_CT_CORRECTION: -5
OS_CT_UM: 606
OD_CT_UM: 614

## 2025-01-27 ASSESSMENT — KERATOMETRY
OD_K1POWER_DIOPTERS: 43.50
METHOD_AUTO_MANUAL: AUTO
OD_AXISANGLE_DEGREES: 077
OS_K1POWER_DIOPTERS: 44.00
OS_K2POWER_DIOPTERS: 45.00
OS_AXISANGLE_DEGREES: 111
OD_K2POWER_DIOPTERS: 45.00

## 2025-01-27 ASSESSMENT — REFRACTION_CURRENTRX
OS_OVR_VA: 20/
OS_AXIS: 180
OD_CYLINDER: PLANO
OS_SPHERE: +1.50
OD_SPHERE: +1.50
OD_AXIS: 180
OS_CYLINDER: PLANO
OD_OVR_VA: 20/

## 2025-01-27 ASSESSMENT — REFRACTION_AUTOREFRACTION
OS_CYLINDER: +1.00
OS_SPHERE: +0.50
OD_CYLINDER: +0.75
OD_SPHERE: +0.25
OD_AXIS: 027
OS_AXIS: 136

## 2025-01-27 ASSESSMENT — VISUAL ACUITY
OS_BCVA: 20/25
OD_BCVA: 20/25

## 2025-01-27 ASSESSMENT — CONFRONTATIONAL VISUAL FIELD TEST (CVF)
OS_FINDINGS: FULL
OD_FINDINGS: FULL

## 2025-01-27 ASSESSMENT — SUPERFICIAL PUNCTATE KERATITIS (SPK)
OS_SPK: T
OD_SPK: T

## 2025-01-27 ASSESSMENT — TONOMETRY
OS_IOP_MMHG: 15
OD_IOP_MMHG: 15

## 2025-05-08 ENCOUNTER — APPOINTMENT (OUTPATIENT)
Dept: CARDIOLOGY | Facility: CLINIC | Age: 75
End: 2025-05-08
Payer: MEDICARE

## 2025-05-08 VITALS
BODY MASS INDEX: 20.73 KG/M2 | SYSTOLIC BLOOD PRESSURE: 120 MMHG | DIASTOLIC BLOOD PRESSURE: 70 MMHG | OXYGEN SATURATION: 97 % | RESPIRATION RATE: 18 BRPM | HEART RATE: 70 BPM | WEIGHT: 117 LBS

## 2025-05-08 DIAGNOSIS — I49.3 VENTRICULAR PREMATURE DEPOLARIZATION: ICD-10-CM

## 2025-05-08 DIAGNOSIS — R93.1 ABNORMAL FINDINGS ON DIAGNOSTIC IMAGING OF HEART AND CORONARY CIRCULATION: ICD-10-CM

## 2025-05-08 DIAGNOSIS — R00.2 PALPITATIONS: ICD-10-CM

## 2025-05-08 PROCEDURE — 93306 TTE W/DOPPLER COMPLETE: CPT

## 2025-05-08 PROCEDURE — 99203 OFFICE O/P NEW LOW 30 MIN: CPT

## 2025-05-08 PROCEDURE — 93000 ELECTROCARDIOGRAM COMPLETE: CPT

## 2025-05-08 RX ORDER — LAMOTRIGINE 100 MG/1
100 TABLET ORAL
Refills: 0 | Status: ACTIVE | COMMUNITY

## 2025-06-02 ENCOUNTER — APPOINTMENT (OUTPATIENT)
Dept: CARDIOLOGY | Facility: CLINIC | Age: 75
End: 2025-06-02
Payer: MEDICARE

## 2025-06-02 VITALS
DIASTOLIC BLOOD PRESSURE: 80 MMHG | SYSTOLIC BLOOD PRESSURE: 150 MMHG | RESPIRATION RATE: 16 BRPM | BODY MASS INDEX: 20.73 KG/M2 | HEART RATE: 67 BPM | OXYGEN SATURATION: 98 % | WEIGHT: 117 LBS

## 2025-06-02 DIAGNOSIS — R93.1 ABNORMAL FINDINGS ON DIAGNOSTIC IMAGING OF HEART AND CORONARY CIRCULATION: ICD-10-CM

## 2025-06-02 DIAGNOSIS — I49.3 VENTRICULAR PREMATURE DEPOLARIZATION: ICD-10-CM

## 2025-06-02 DIAGNOSIS — I67.1 CEREBRAL ANEURYSM, NONRUPTURED: ICD-10-CM

## 2025-06-02 PROCEDURE — 93246 EXT ECG>7D<15D RECORDING: CPT

## 2025-06-02 PROCEDURE — 99213 OFFICE O/P EST LOW 20 MIN: CPT

## 2025-06-02 RX ORDER — METOPROLOL SUCCINATE 25 MG/1
25 TABLET, EXTENDED RELEASE ORAL
Qty: 30 | Refills: 3 | Status: ACTIVE | COMMUNITY
Start: 2025-06-02 | End: 1900-01-01

## 2025-06-16 ENCOUNTER — OFFICE (OUTPATIENT)
Facility: LOCATION | Age: 75
Setting detail: OPHTHALMOLOGY
End: 2025-06-16
Payer: COMMERCIAL

## 2025-06-16 DIAGNOSIS — Z96.1: ICD-10-CM

## 2025-06-16 DIAGNOSIS — H16.223: ICD-10-CM

## 2025-06-16 DIAGNOSIS — H40.013: ICD-10-CM

## 2025-06-16 PROCEDURE — 92012 INTRM OPH EXAM EST PATIENT: CPT | Performed by: OPHTHALMOLOGY

## 2025-06-16 ASSESSMENT — KERATOMETRY
OD_K1POWER_DIOPTERS: 43.50
METHOD_AUTO_MANUAL: AUTO
OD_K2POWER_DIOPTERS: 45.00
OS_AXISANGLE_DEGREES: 111
OD_AXISANGLE_DEGREES: 077
OS_K1POWER_DIOPTERS: 44.00
OS_K2POWER_DIOPTERS: 45.00

## 2025-06-16 ASSESSMENT — REFRACTION_CURRENTRX
OD_SPHERE: +1.50
OD_CYLINDER: PLANO
OD_OVR_VA: 20/
OS_CYLINDER: PLANO
OS_SPHERE: +1.50
OS_AXIS: 180
OS_OVR_VA: 20/
OD_AXIS: 180

## 2025-06-16 ASSESSMENT — REFRACTION_AUTOREFRACTION
OS_AXIS: 136
OS_SPHERE: +0.50
OS_CYLINDER: +1.00
OD_SPHERE: +0.25
OD_AXIS: 027
OD_CYLINDER: +0.75

## 2025-06-16 ASSESSMENT — SUPERFICIAL PUNCTATE KERATITIS (SPK)
OS_SPK: T
OD_SPK: T

## 2025-06-16 ASSESSMENT — VISUAL ACUITY
OD_BCVA: 20/25
OS_BCVA: 20/30

## 2025-06-16 ASSESSMENT — CONFRONTATIONAL VISUAL FIELD TEST (CVF)
OS_FINDINGS: FULL
OD_FINDINGS: FULL

## 2025-07-18 ENCOUNTER — APPOINTMENT (OUTPATIENT)
Dept: CARDIOLOGY | Facility: CLINIC | Age: 75
End: 2025-07-18
Payer: MEDICARE

## 2025-07-18 VITALS
HEIGHT: 63 IN | HEART RATE: 58 BPM | SYSTOLIC BLOOD PRESSURE: 140 MMHG | WEIGHT: 116 LBS | DIASTOLIC BLOOD PRESSURE: 80 MMHG | BODY MASS INDEX: 20.55 KG/M2

## 2025-07-18 PROBLEM — I47.10 PSVT (PAROXYSMAL SUPRAVENTRICULAR TACHYCARDIA): Status: ACTIVE | Noted: 2025-07-18

## 2025-07-18 PROBLEM — I25.10 CAD (CORONARY ARTERY DISEASE): Status: ACTIVE | Noted: 2025-07-18

## 2025-07-18 PROCEDURE — 99214 OFFICE O/P EST MOD 30 MIN: CPT

## 2025-07-18 PROCEDURE — 93248 EXT ECG>7D<15D REV&INTERPJ: CPT

## 2025-07-28 ENCOUNTER — OFFICE (OUTPATIENT)
Facility: LOCATION | Age: 75
Setting detail: OPHTHALMOLOGY
End: 2025-07-28
Payer: COMMERCIAL

## 2025-07-28 DIAGNOSIS — H16.223: ICD-10-CM

## 2025-07-28 DIAGNOSIS — H40.013: ICD-10-CM

## 2025-07-28 DIAGNOSIS — Z96.1: ICD-10-CM

## 2025-07-28 PROCEDURE — 92133 CPTRZD OPH DX IMG PST SGM ON: CPT | Performed by: OPHTHALMOLOGY

## 2025-07-28 PROCEDURE — 92083 EXTENDED VISUAL FIELD XM: CPT | Performed by: OPHTHALMOLOGY

## 2025-07-28 PROCEDURE — 92012 INTRM OPH EXAM EST PATIENT: CPT | Performed by: OPHTHALMOLOGY

## 2025-07-28 ASSESSMENT — REFRACTION_AUTOREFRACTION
OS_SPHERE: +0.50
OS_CYLINDER: +1.00
OD_SPHERE: +0.25
OD_CYLINDER: +0.75
OD_AXIS: 027
OS_AXIS: 136

## 2025-07-28 ASSESSMENT — KERATOMETRY
OD_K2POWER_DIOPTERS: 45.00
OS_K1POWER_DIOPTERS: 44.00
OD_AXISANGLE_DEGREES: 077
OS_AXISANGLE_DEGREES: 111
OS_K2POWER_DIOPTERS: 45.00
OD_K1POWER_DIOPTERS: 43.50
METHOD_AUTO_MANUAL: AUTO

## 2025-07-28 ASSESSMENT — REFRACTION_CURRENTRX
OS_SPHERE: +1.50
OS_OVR_VA: 20/
OD_AXIS: 180
OD_CYLINDER: PLANO
OD_OVR_VA: 20/
OS_CYLINDER: PLANO
OS_AXIS: 180
OD_SPHERE: +1.50

## 2025-07-28 ASSESSMENT — CONFRONTATIONAL VISUAL FIELD TEST (CVF)
OS_FINDINGS: FULL
OD_FINDINGS: FULL

## 2025-07-28 ASSESSMENT — SUPERFICIAL PUNCTATE KERATITIS (SPK)
OD_SPK: T
OS_SPK: T

## 2025-07-28 ASSESSMENT — VISUAL ACUITY
OD_BCVA: 20/25
OS_BCVA: 20/30

## 2025-08-06 ENCOUNTER — NON-APPOINTMENT (OUTPATIENT)
Age: 75
End: 2025-08-06

## 2025-08-08 ENCOUNTER — APPOINTMENT (OUTPATIENT)
Dept: ORTHOPEDIC SURGERY | Facility: CLINIC | Age: 75
End: 2025-08-08

## 2025-08-08 ENCOUNTER — APPOINTMENT (OUTPATIENT)
Dept: ORTHOPEDIC SURGERY | Facility: CLINIC | Age: 75
End: 2025-08-08
Payer: MEDICARE

## 2025-08-08 ENCOUNTER — RESULT REVIEW (OUTPATIENT)
Age: 75
End: 2025-08-08

## 2025-08-08 VITALS — WEIGHT: 119 LBS | HEIGHT: 63 IN | BODY MASS INDEX: 21.09 KG/M2

## 2025-08-08 DIAGNOSIS — M16.11 UNILATERAL PRIMARY OSTEOARTHRITIS, RIGHT HIP: ICD-10-CM

## 2025-08-08 PROCEDURE — 99204 OFFICE O/P NEW MOD 45 MIN: CPT | Mod: 57

## 2025-08-08 PROCEDURE — G2211 COMPLEX E/M VISIT ADD ON: CPT
